# Patient Record
Sex: FEMALE | Race: WHITE | NOT HISPANIC OR LATINO | Employment: PART TIME | URBAN - NONMETROPOLITAN AREA
[De-identification: names, ages, dates, MRNs, and addresses within clinical notes are randomized per-mention and may not be internally consistent; named-entity substitution may affect disease eponyms.]

---

## 2018-06-11 ENCOUNTER — APPOINTMENT (OUTPATIENT)
Dept: LAB | Facility: HOSPITAL | Age: 28
End: 2018-06-11
Attending: OBSTETRICS & GYNECOLOGY

## 2018-06-11 ENCOUNTER — TRANSCRIBE ORDERS (OUTPATIENT)
Dept: ADMINISTRATIVE | Facility: HOSPITAL | Age: 28
End: 2018-06-11

## 2018-06-11 DIAGNOSIS — Z32.02 PREGNANCY TEST NEGATIVE: ICD-10-CM

## 2018-06-11 DIAGNOSIS — Z01.419 PAP SMEAR, AS PART OF ROUTINE GYNECOLOGICAL EXAMINATION: ICD-10-CM

## 2018-06-11 DIAGNOSIS — R10.2 VAGINAL PAIN: Primary | ICD-10-CM

## 2018-06-11 LAB
ALBUMIN SERPL-MCNC: 4.8 G/DL (ref 3.5–5)
ALBUMIN/GLOB SERPL: 1.5 G/DL (ref 1.5–2.5)
ALP SERPL-CCNC: 83 U/L (ref 35–104)
ALT SERPL W P-5'-P-CCNC: 18 U/L (ref 10–36)
ANION GAP SERPL CALCULATED.3IONS-SCNC: 1.5 MMOL/L (ref 3.6–11.2)
AST SERPL-CCNC: 22 U/L (ref 10–30)
BACTERIA UR QL AUTO: ABNORMAL /HPF
BASOPHILS # BLD AUTO: 0.03 10*3/MM3 (ref 0–0.3)
BASOPHILS NFR BLD AUTO: 0.3 % (ref 0–2)
BILIRUB SERPL-MCNC: 0.4 MG/DL (ref 0.2–1.8)
BILIRUB UR QL STRIP: NEGATIVE
BUN BLD-MCNC: 10 MG/DL (ref 7–21)
BUN/CREAT SERPL: 15.4 (ref 7–25)
CALCIUM SPEC-SCNC: 9.3 MG/DL (ref 7.7–10)
CHLORIDE SERPL-SCNC: 106 MMOL/L (ref 99–112)
CLARITY UR: CLEAR
CO2 SERPL-SCNC: 27.5 MMOL/L (ref 24.3–31.9)
COLOR UR: YELLOW
CREAT BLD-MCNC: 0.65 MG/DL (ref 0.43–1.29)
DEPRECATED RDW RBC AUTO: 41.8 FL (ref 37–54)
EOSINOPHIL # BLD AUTO: 0.22 10*3/MM3 (ref 0–0.7)
EOSINOPHIL NFR BLD AUTO: 2 % (ref 0–5)
ERYTHROCYTE [DISTWIDTH] IN BLOOD BY AUTOMATED COUNT: 13.3 % (ref 11.5–14.5)
GFR SERPL CREATININE-BSD FRML MDRD: 110 ML/MIN/1.73
GLOBULIN UR ELPH-MCNC: 3.2 GM/DL
GLUCOSE BLD-MCNC: 88 MG/DL (ref 70–110)
GLUCOSE UR STRIP-MCNC: NEGATIVE MG/DL
HCG INTACT+B SERPL-ACNC: <2 MIU/ML (ref 0–5)
HCT VFR BLD AUTO: 38.2 % (ref 37–47)
HGB BLD-MCNC: 12.4 G/DL (ref 12–16)
HGB UR QL STRIP.AUTO: ABNORMAL
HYALINE CASTS UR QL AUTO: ABNORMAL /LPF
IMM GRANULOCYTES # BLD: 0.03 10*3/MM3 (ref 0–0.03)
IMM GRANULOCYTES NFR BLD: 0.3 % (ref 0–0.5)
KETONES UR QL STRIP: NEGATIVE
LEUKOCYTE ESTERASE UR QL STRIP.AUTO: NEGATIVE
LYMPHOCYTES # BLD AUTO: 2.86 10*3/MM3 (ref 1–3)
LYMPHOCYTES NFR BLD AUTO: 25.4 % (ref 21–51)
MCH RBC QN AUTO: 28.6 PG (ref 27–33)
MCHC RBC AUTO-ENTMCNC: 32.5 G/DL (ref 33–37)
MCV RBC AUTO: 88.2 FL (ref 80–94)
MONOCYTES # BLD AUTO: 0.61 10*3/MM3 (ref 0.1–0.9)
MONOCYTES NFR BLD AUTO: 5.4 % (ref 0–10)
NEUTROPHILS # BLD AUTO: 7.53 10*3/MM3 (ref 1.4–6.5)
NEUTROPHILS NFR BLD AUTO: 66.6 % (ref 30–70)
NITRITE UR QL STRIP: NEGATIVE
OSMOLALITY SERPL CALC.SUM OF ELEC: 268.6 MOSM/KG (ref 273–305)
PH UR STRIP.AUTO: 6.5 [PH] (ref 5–8)
PLATELET # BLD AUTO: 340 10*3/MM3 (ref 130–400)
PMV BLD AUTO: 11.1 FL (ref 6–10)
POTASSIUM BLD-SCNC: 3.8 MMOL/L (ref 3.5–5.3)
PROT SERPL-MCNC: 8 G/DL (ref 6–8)
PROT UR QL STRIP: NEGATIVE
RBC # BLD AUTO: 4.33 10*6/MM3 (ref 4.2–5.4)
RBC # UR: ABNORMAL /HPF
REF LAB TEST METHOD: ABNORMAL
SODIUM BLD-SCNC: 135 MMOL/L (ref 135–153)
SP GR UR STRIP: 1.01 (ref 1–1.03)
SQUAMOUS #/AREA URNS HPF: ABNORMAL /HPF
T4 FREE SERPL-MCNC: 1.03 NG/DL (ref 0.89–1.76)
TSH SERPL DL<=0.05 MIU/L-ACNC: 1.98 MIU/ML (ref 0.55–4.78)
UROBILINOGEN UR QL STRIP: ABNORMAL
WBC NRBC COR # BLD: 11.28 10*3/MM3 (ref 4.5–12.5)
WBC UR QL AUTO: ABNORMAL /HPF

## 2018-06-11 PROCEDURE — 36415 COLL VENOUS BLD VENIPUNCTURE: CPT | Performed by: OBSTETRICS & GYNECOLOGY

## 2018-06-11 PROCEDURE — 81001 URINALYSIS AUTO W/SCOPE: CPT | Performed by: OBSTETRICS & GYNECOLOGY

## 2018-06-11 PROCEDURE — 84702 CHORIONIC GONADOTROPIN TEST: CPT | Performed by: OBSTETRICS & GYNECOLOGY

## 2018-06-11 PROCEDURE — 84443 ASSAY THYROID STIM HORMONE: CPT | Performed by: OBSTETRICS & GYNECOLOGY

## 2018-06-11 PROCEDURE — 85025 COMPLETE CBC W/AUTO DIFF WBC: CPT | Performed by: OBSTETRICS & GYNECOLOGY

## 2018-06-11 PROCEDURE — 80053 COMPREHEN METABOLIC PANEL: CPT | Performed by: OBSTETRICS & GYNECOLOGY

## 2018-06-11 PROCEDURE — 84439 ASSAY OF FREE THYROXINE: CPT | Performed by: OBSTETRICS & GYNECOLOGY

## 2018-07-12 ENCOUNTER — PREP FOR SURGERY (OUTPATIENT)
Dept: OTHER | Facility: HOSPITAL | Age: 28
End: 2018-07-12

## 2018-07-12 DIAGNOSIS — D27.1: Primary | ICD-10-CM

## 2018-07-12 RX ORDER — SODIUM CHLORIDE 0.9 % (FLUSH) 0.9 %
1-10 SYRINGE (ML) INJECTION AS NEEDED
Status: CANCELLED | OUTPATIENT
Start: 2018-07-24

## 2018-07-23 ENCOUNTER — APPOINTMENT (OUTPATIENT)
Dept: PREADMISSION TESTING | Facility: HOSPITAL | Age: 28
End: 2018-07-23

## 2018-07-23 DIAGNOSIS — D27.1: ICD-10-CM

## 2018-07-23 LAB
ABO GROUP BLD: NORMAL
ANION GAP SERPL CALCULATED.3IONS-SCNC: 5.8 MMOL/L (ref 3.6–11.2)
BASOPHILS # BLD AUTO: 0.03 10*3/MM3 (ref 0–0.3)
BASOPHILS NFR BLD AUTO: 0.3 % (ref 0–2)
BLD GP AB SCN SERPL QL: NEGATIVE
BUN BLD-MCNC: 9 MG/DL (ref 7–21)
BUN/CREAT SERPL: 14.3 (ref 7–25)
CALCIUM SPEC-SCNC: 9.6 MG/DL (ref 7.7–10)
CHLORIDE SERPL-SCNC: 104 MMOL/L (ref 99–112)
CO2 SERPL-SCNC: 27.2 MMOL/L (ref 24.3–31.9)
CREAT BLD-MCNC: 0.63 MG/DL (ref 0.43–1.29)
DEPRECATED RDW RBC AUTO: 41.9 FL (ref 37–54)
EOSINOPHIL # BLD AUTO: 0.43 10*3/MM3 (ref 0–0.7)
EOSINOPHIL NFR BLD AUTO: 4.1 % (ref 0–5)
ERYTHROCYTE [DISTWIDTH] IN BLOOD BY AUTOMATED COUNT: 13.3 % (ref 11.5–14.5)
GFR SERPL CREATININE-BSD FRML MDRD: 114 ML/MIN/1.73
GLUCOSE BLD-MCNC: 95 MG/DL (ref 70–110)
HCT VFR BLD AUTO: 39.6 % (ref 37–47)
HGB BLD-MCNC: 12.9 G/DL (ref 12–16)
IMM GRANULOCYTES # BLD: 0.06 10*3/MM3 (ref 0–0.03)
IMM GRANULOCYTES NFR BLD: 0.6 % (ref 0–0.5)
LYMPHOCYTES # BLD AUTO: 2.83 10*3/MM3 (ref 1–3)
LYMPHOCYTES NFR BLD AUTO: 27.3 % (ref 21–51)
MCH RBC QN AUTO: 28.9 PG (ref 27–33)
MCHC RBC AUTO-ENTMCNC: 32.6 G/DL (ref 33–37)
MCV RBC AUTO: 88.6 FL (ref 80–94)
MONOCYTES # BLD AUTO: 0.66 10*3/MM3 (ref 0.1–0.9)
MONOCYTES NFR BLD AUTO: 6.4 % (ref 0–10)
NEUTROPHILS # BLD AUTO: 6.37 10*3/MM3 (ref 1.4–6.5)
NEUTROPHILS NFR BLD AUTO: 61.3 % (ref 30–70)
OSMOLALITY SERPL CALC.SUM OF ELEC: 272.3 MOSM/KG (ref 273–305)
PLATELET # BLD AUTO: 326 10*3/MM3 (ref 130–400)
PMV BLD AUTO: 11.6 FL (ref 6–10)
POTASSIUM BLD-SCNC: 4 MMOL/L (ref 3.5–5.3)
RBC # BLD AUTO: 4.47 10*6/MM3 (ref 4.2–5.4)
RH BLD: POSITIVE
SODIUM BLD-SCNC: 137 MMOL/L (ref 135–153)
T&S EXPIRATION DATE: NORMAL
WBC NRBC COR # BLD: 10.38 10*3/MM3 (ref 4.5–12.5)

## 2018-07-23 PROCEDURE — 80048 BASIC METABOLIC PNL TOTAL CA: CPT | Performed by: OBSTETRICS & GYNECOLOGY

## 2018-07-23 PROCEDURE — 86850 RBC ANTIBODY SCREEN: CPT | Performed by: OBSTETRICS & GYNECOLOGY

## 2018-07-23 PROCEDURE — 86901 BLOOD TYPING SEROLOGIC RH(D): CPT | Performed by: OBSTETRICS & GYNECOLOGY

## 2018-07-23 PROCEDURE — 36415 COLL VENOUS BLD VENIPUNCTURE: CPT

## 2018-07-23 PROCEDURE — 86900 BLOOD TYPING SEROLOGIC ABO: CPT | Performed by: OBSTETRICS & GYNECOLOGY

## 2018-07-23 PROCEDURE — 85025 COMPLETE CBC W/AUTO DIFF WBC: CPT | Performed by: OBSTETRICS & GYNECOLOGY

## 2018-07-23 NOTE — DISCHARGE INSTRUCTIONS
You are scheduled to arrive for your surgery on July 24, 2018 at 9:00 AM    TAKE the following medications the morning of surgery:  All heart or blood pressure medications    Please discontinue all blood thinners and anticoagulants (except aspirin) prior to surgery as per your surgeon and cardiologist instructions.  Aspirin may be continued up to the day prior to surgery.    HOLD all diabetic medications the morning of surgery as order by physician.    Please follow instructions on use of prep cloths provided by nurse. Return instruction sheet with stickers attached to pre-op nurse on day of surgery.    General Instructions:  • Do NOT eat or drink after midnight which includes water, mints, or gum.  • You may brush your teeth. Dental appliances that are removable must be taken out day of surgery.  • Do NOT smoke, chew tobacco, or drink alcohol within 24 hours prior to surgery.  • Bring medications in original bottles, any inhalers and if applicable your C-PAP/BI-PAP machine  • Bring any papers given to you in the doctor’s office  • Wear clean, comfortable clothes and socks  • Do NOT wear contact lenses or make-up or dark nail polish.  Bring a case for your glasses if applicable.  • Bring crutches or walker if applicable  • Leave all other valuables and jewelry at home  • If you were given a blood bank armband, continue to wear it until discharged.    Preventing a Surgical Site Infection:  • Shower the night before surgery (unless instructed otherwise) using a fresh bar of anti-bacterial soap (such as Dial) and clean washcloth.  Dry with a clean towel and dress in clean clothing.  • For 2 to 3 days before surgery, avoid shaving with a razor near where you will have surgery because the razor can irritate skin and make it easier to develop an infection.  Ask your surgeon if you will be receiving antibiotics prior to surgery.  • Make sure you, your family, and all healthcare providers clean their hands with soap and  water or an alcohol-based hand  before caring for you or your wound.  • If at all possible, quit smoking as many days before surgery as you can.    Day of Surgery:  Upon arrival, a pre-op nurse and anesthesiologist will review your health history, obtain vital signs, and answer questions you may have.  The only belongings needed at this time will be your home medications and if applicable you C-PAP/BI-PAP machine.  If you are staying overnight, your family can leave the rest of your belongings in the car and bring them to your room later.  A pre-op nurse will start an IV and you may receive medication in preparation for surgery.  Due to patient privacy and limited space, only one member of your family will be able to accompany you in the pre-op area.  While you are in surgery your family should notify the waiting room  if they leave the waiting room area and provide a contact number.  Please be aware that surgery does come with discomfort.  We want to make every effort to control your discomfort so please discuss any uncontrolled symptoms with your nurse.  Your doctor will most likely have prescribed pain medications.  If you are going home after surgery you will receive individualized written care instructions before being discharged.  A responsible adult must drive you to and from the hospital on the day of surgery and stay with you for 24 hours.  If you are staying overnight following surgery, you will be transported to your hospital room following the recovery period.

## 2018-07-24 ENCOUNTER — HOSPITAL ENCOUNTER (OUTPATIENT)
Facility: HOSPITAL | Age: 28
Setting detail: HOSPITAL OUTPATIENT SURGERY
Discharge: HOME | End: 2018-07-24
Attending: OBSTETRICS & GYNECOLOGY | Admitting: OBSTETRICS & GYNECOLOGY

## 2018-07-24 ENCOUNTER — APPOINTMENT (OUTPATIENT)
Dept: GENERAL RADIOLOGY | Facility: HOSPITAL | Age: 28
End: 2018-07-24
Attending: OBSTETRICS & GYNECOLOGY

## 2018-07-24 ENCOUNTER — ANESTHESIA EVENT (OUTPATIENT)
Dept: PERIOP | Facility: HOSPITAL | Age: 28
End: 2018-07-24

## 2018-07-24 ENCOUNTER — ANESTHESIA (OUTPATIENT)
Dept: PERIOP | Facility: HOSPITAL | Age: 28
End: 2018-07-24

## 2018-07-24 VITALS
SYSTOLIC BLOOD PRESSURE: 123 MMHG | BODY MASS INDEX: 28 KG/M2 | HEIGHT: 64 IN | OXYGEN SATURATION: 97 % | TEMPERATURE: 97.3 F | HEART RATE: 77 BPM | DIASTOLIC BLOOD PRESSURE: 75 MMHG | WEIGHT: 164 LBS | RESPIRATION RATE: 16 BRPM

## 2018-07-24 DIAGNOSIS — D27.1: ICD-10-CM

## 2018-07-24 DIAGNOSIS — N83.8 OVARIAN MASS, LEFT: ICD-10-CM

## 2018-07-24 LAB
B-HCG UR QL: NEGATIVE
INTERNAL NEGATIVE CONTROL: NEGATIVE
INTERNAL POSITIVE CONTROL: POSITIVE
Lab: NORMAL

## 2018-07-24 PROCEDURE — 25010000002 ONDANSETRON PER 1 MG: Performed by: NURSE ANESTHETIST, CERTIFIED REGISTERED

## 2018-07-24 PROCEDURE — 25010000002 CEFOXITIN: Performed by: OBSTETRICS & GYNECOLOGY

## 2018-07-24 PROCEDURE — 25010000002 NEOSTIGMINE 10 MG/10ML SOLUTION: Performed by: NURSE ANESTHETIST, CERTIFIED REGISTERED

## 2018-07-24 PROCEDURE — 25010000002 PROPOFOL 10 MG/ML EMULSION: Performed by: NURSE ANESTHETIST, CERTIFIED REGISTERED

## 2018-07-24 PROCEDURE — 81025 URINE PREGNANCY TEST: CPT | Performed by: ANESTHESIOLOGY

## 2018-07-24 PROCEDURE — 25010000002 FENTANYL CITRATE (PF) 100 MCG/2ML SOLUTION: Performed by: NURSE ANESTHETIST, CERTIFIED REGISTERED

## 2018-07-24 PROCEDURE — 25010000002 DEXAMETHASONE PER 1 MG: Performed by: NURSE ANESTHETIST, CERTIFIED REGISTERED

## 2018-07-24 PROCEDURE — 25010000002 MIDAZOLAM PER 1 MG: Performed by: NURSE ANESTHETIST, CERTIFIED REGISTERED

## 2018-07-24 RX ORDER — OXYCODONE HYDROCHLORIDE AND ACETAMINOPHEN 5; 325 MG/1; MG/1
1 TABLET ORAL EVERY 4 HOURS PRN
Qty: 40 TABLET | Refills: 0 | Status: ON HOLD | OUTPATIENT
Start: 2018-07-24 | End: 2019-03-05

## 2018-07-24 RX ORDER — MEPERIDINE HYDROCHLORIDE 50 MG/ML
12.5 INJECTION INTRAMUSCULAR; INTRAVENOUS; SUBCUTANEOUS
Status: DISCONTINUED | OUTPATIENT
Start: 2018-07-24 | End: 2018-07-24 | Stop reason: HOSPADM

## 2018-07-24 RX ORDER — IPRATROPIUM BROMIDE AND ALBUTEROL SULFATE 2.5; .5 MG/3ML; MG/3ML
3 SOLUTION RESPIRATORY (INHALATION) ONCE AS NEEDED
Status: DISCONTINUED | OUTPATIENT
Start: 2018-07-24 | End: 2018-07-24 | Stop reason: HOSPADM

## 2018-07-24 RX ORDER — MIDAZOLAM HYDROCHLORIDE 1 MG/ML
1 INJECTION INTRAMUSCULAR; INTRAVENOUS
Status: DISCONTINUED | OUTPATIENT
Start: 2018-07-24 | End: 2018-07-24 | Stop reason: HOSPADM

## 2018-07-24 RX ORDER — SODIUM CHLORIDE, SODIUM LACTATE, POTASSIUM CHLORIDE, CALCIUM CHLORIDE 600; 310; 30; 20 MG/100ML; MG/100ML; MG/100ML; MG/100ML
125 INJECTION, SOLUTION INTRAVENOUS CONTINUOUS
Status: DISCONTINUED | OUTPATIENT
Start: 2018-07-24 | End: 2018-07-24 | Stop reason: HOSPADM

## 2018-07-24 RX ORDER — FENTANYL CITRATE 50 UG/ML
INJECTION, SOLUTION INTRAMUSCULAR; INTRAVENOUS AS NEEDED
Status: DISCONTINUED | OUTPATIENT
Start: 2018-07-24 | End: 2018-07-24 | Stop reason: SURG

## 2018-07-24 RX ORDER — SODIUM CHLORIDE 9 MG/ML
INJECTION, SOLUTION INTRAVENOUS AS NEEDED
Status: DISCONTINUED | OUTPATIENT
Start: 2018-07-24 | End: 2018-07-24 | Stop reason: HOSPADM

## 2018-07-24 RX ORDER — LIDOCAINE HYDROCHLORIDE 20 MG/ML
INJECTION, SOLUTION INFILTRATION; PERINEURAL AS NEEDED
Status: DISCONTINUED | OUTPATIENT
Start: 2018-07-24 | End: 2018-07-24 | Stop reason: SURG

## 2018-07-24 RX ORDER — MAGNESIUM HYDROXIDE 1200 MG/15ML
LIQUID ORAL AS NEEDED
Status: DISCONTINUED | OUTPATIENT
Start: 2018-07-24 | End: 2018-07-24 | Stop reason: HOSPADM

## 2018-07-24 RX ORDER — FAMOTIDINE 10 MG/ML
INJECTION, SOLUTION INTRAVENOUS AS NEEDED
Status: DISCONTINUED | OUTPATIENT
Start: 2018-07-24 | End: 2018-07-24 | Stop reason: SURG

## 2018-07-24 RX ORDER — BUPIVACAINE HYDROCHLORIDE AND EPINEPHRINE 5; 5 MG/ML; UG/ML
INJECTION, SOLUTION EPIDURAL; INTRACAUDAL; PERINEURAL AS NEEDED
Status: DISCONTINUED | OUTPATIENT
Start: 2018-07-24 | End: 2018-07-24 | Stop reason: HOSPADM

## 2018-07-24 RX ORDER — FENTANYL CITRATE 50 UG/ML
50 INJECTION, SOLUTION INTRAMUSCULAR; INTRAVENOUS
Status: DISCONTINUED | OUTPATIENT
Start: 2018-07-24 | End: 2018-07-24 | Stop reason: HOSPADM

## 2018-07-24 RX ORDER — OXYCODONE HYDROCHLORIDE AND ACETAMINOPHEN 5; 325 MG/1; MG/1
1 TABLET ORAL ONCE AS NEEDED
Status: DISCONTINUED | OUTPATIENT
Start: 2018-07-24 | End: 2018-07-24 | Stop reason: HOSPADM

## 2018-07-24 RX ORDER — ROCURONIUM BROMIDE 10 MG/ML
INJECTION, SOLUTION INTRAVENOUS AS NEEDED
Status: DISCONTINUED | OUTPATIENT
Start: 2018-07-24 | End: 2018-07-24 | Stop reason: SURG

## 2018-07-24 RX ORDER — MIDAZOLAM HYDROCHLORIDE 1 MG/ML
2 INJECTION INTRAMUSCULAR; INTRAVENOUS
Status: DISCONTINUED | OUTPATIENT
Start: 2018-07-24 | End: 2018-07-24 | Stop reason: HOSPADM

## 2018-07-24 RX ORDER — GLYCOPYRROLATE 0.2 MG/ML
INJECTION INTRAMUSCULAR; INTRAVENOUS AS NEEDED
Status: DISCONTINUED | OUTPATIENT
Start: 2018-07-24 | End: 2018-07-24 | Stop reason: SURG

## 2018-07-24 RX ORDER — SODIUM CHLORIDE 0.9 % (FLUSH) 0.9 %
1-10 SYRINGE (ML) INJECTION AS NEEDED
Status: DISCONTINUED | OUTPATIENT
Start: 2018-07-24 | End: 2018-07-24 | Stop reason: HOSPADM

## 2018-07-24 RX ORDER — ONDANSETRON 2 MG/ML
INJECTION INTRAMUSCULAR; INTRAVENOUS AS NEEDED
Status: DISCONTINUED | OUTPATIENT
Start: 2018-07-24 | End: 2018-07-24 | Stop reason: SURG

## 2018-07-24 RX ORDER — MIDAZOLAM HYDROCHLORIDE 1 MG/ML
INJECTION INTRAMUSCULAR; INTRAVENOUS AS NEEDED
Status: DISCONTINUED | OUTPATIENT
Start: 2018-07-24 | End: 2018-07-24 | Stop reason: SURG

## 2018-07-24 RX ORDER — DEXAMETHASONE SODIUM PHOSPHATE 4 MG/ML
INJECTION, SOLUTION INTRA-ARTICULAR; INTRALESIONAL; INTRAMUSCULAR; INTRAVENOUS; SOFT TISSUE AS NEEDED
Status: DISCONTINUED | OUTPATIENT
Start: 2018-07-24 | End: 2018-07-24 | Stop reason: SURG

## 2018-07-24 RX ORDER — NEOSTIGMINE METHYLSULFATE 1 MG/ML
INJECTION, SOLUTION INTRAVENOUS AS NEEDED
Status: DISCONTINUED | OUTPATIENT
Start: 2018-07-24 | End: 2018-07-24 | Stop reason: SURG

## 2018-07-24 RX ORDER — PROPOFOL 10 MG/ML
VIAL (ML) INTRAVENOUS AS NEEDED
Status: DISCONTINUED | OUTPATIENT
Start: 2018-07-24 | End: 2018-07-24 | Stop reason: SURG

## 2018-07-24 RX ORDER — ONDANSETRON 2 MG/ML
4 INJECTION INTRAMUSCULAR; INTRAVENOUS ONCE AS NEEDED
Status: DISCONTINUED | OUTPATIENT
Start: 2018-07-24 | End: 2018-07-24 | Stop reason: HOSPADM

## 2018-07-24 RX ADMIN — ONDANSETRON 4 MG: 2 INJECTION, SOLUTION INTRAMUSCULAR; INTRAVENOUS at 11:40

## 2018-07-24 RX ADMIN — ONDANSETRON 4 MG: 2 INJECTION, SOLUTION INTRAMUSCULAR; INTRAVENOUS at 13:46

## 2018-07-24 RX ADMIN — FENTANYL CITRATE 100 MCG: 50 INJECTION, SOLUTION INTRAMUSCULAR; INTRAVENOUS at 11:40

## 2018-07-24 RX ADMIN — DEXAMETHASONE SODIUM PHOSPHATE 8 MG: 4 INJECTION, SOLUTION INTRAMUSCULAR; INTRAVENOUS at 11:40

## 2018-07-24 RX ADMIN — OXYCODONE HYDROCHLORIDE AND ACETAMINOPHEN 1 TABLET: 5; 325 TABLET ORAL at 13:46

## 2018-07-24 RX ADMIN — ROCURONIUM BROMIDE 30 MG: 10 INJECTION INTRAVENOUS at 11:40

## 2018-07-24 RX ADMIN — SODIUM CHLORIDE, POTASSIUM CHLORIDE, SODIUM LACTATE AND CALCIUM CHLORIDE: 600; 310; 30; 20 INJECTION, SOLUTION INTRAVENOUS at 11:33

## 2018-07-24 RX ADMIN — MIDAZOLAM HYDROCHLORIDE 2 MG: 1 INJECTION, SOLUTION INTRAMUSCULAR; INTRAVENOUS at 11:34

## 2018-07-24 RX ADMIN — PROPOFOL 150 MG: 10 INJECTION, EMULSION INTRAVENOUS at 11:40

## 2018-07-24 RX ADMIN — FENTANYL CITRATE 50 MCG: 50 INJECTION, SOLUTION INTRAMUSCULAR; INTRAVENOUS at 12:18

## 2018-07-24 RX ADMIN — FENTANYL CITRATE 50 MCG: 50 INJECTION INTRAMUSCULAR; INTRAVENOUS at 12:35

## 2018-07-24 RX ADMIN — FAMOTIDINE 20 MG: 10 INJECTION, SOLUTION INTRAVENOUS at 11:34

## 2018-07-24 RX ADMIN — FENTANYL CITRATE 100 MCG: 50 INJECTION, SOLUTION INTRAMUSCULAR; INTRAVENOUS at 11:34

## 2018-07-24 RX ADMIN — SODIUM CHLORIDE, POTASSIUM CHLORIDE, SODIUM LACTATE AND CALCIUM CHLORIDE: 600; 310; 30; 20 INJECTION, SOLUTION INTRAVENOUS at 12:15

## 2018-07-24 RX ADMIN — SODIUM CHLORIDE, POTASSIUM CHLORIDE, SODIUM LACTATE AND CALCIUM CHLORIDE 125 ML/HR: 600; 310; 30; 20 INJECTION, SOLUTION INTRAVENOUS at 15:00

## 2018-07-24 RX ADMIN — GLYCOPYRROLATE 0.6 MG: 0.2 INJECTION, SOLUTION INTRAMUSCULAR; INTRAVENOUS at 12:16

## 2018-07-24 RX ADMIN — LIDOCAINE HYDROCHLORIDE 60 MG: 20 INJECTION, SOLUTION INFILTRATION; PERINEURAL at 11:34

## 2018-07-24 RX ADMIN — CEFOXITIN 2 G: 2 INJECTION, POWDER, FOR SOLUTION INTRAVENOUS at 11:33

## 2018-07-24 RX ADMIN — SODIUM CHLORIDE, POTASSIUM CHLORIDE, SODIUM LACTATE AND CALCIUM CHLORIDE 125 ML/HR: 600; 310; 30; 20 INJECTION, SOLUTION INTRAVENOUS at 13:52

## 2018-07-24 RX ADMIN — NEOSTIGMINE METHYLSULFATE 4 MG: 1 INJECTION, SOLUTION INTRAVENOUS at 12:16

## 2018-07-24 NOTE — OP NOTE
OPERATIVE NOTE    Preoperative diagnosis: Left ovarian tumor    Postoperative diagnosis: Left ovarian endometrioma    Procedure performed: Laparoscopy with left salpingo-oophorectomy    Specimens removed: Left tube and ovary    Anesthesia: General    Surgeon: Dr. Jordan Mina    Assistant: None    Estimated blood loss: 25 cc    Blood products: None    Grafts/implants: None    Complications: None    Description of the procedure:This patient was taken to the operating room, placed on the operating table in the supine position, and prepped and draped in usual fashion for laparoscopic surgery.  A Payne catheter was placed in her bladder.  We attempted to place a Hulka tenaculum but we could not get through the endocervix.  Following this, a 5 mm Optiview trocar was used to enter through the umbilicus.  Insufflation was accomplished, and a 12 mm trocar was placed in the right lower quadrant and a 5 mm in the left lower quadrant.    We found the following; this patient had a large endometrioma.  The left ovary was stuck to the rectosigmoid and stuck to the posterior aspect of the broad ligament, the posterior cul-de-sac and the left side of the pelvis.  Tube and ovary were involved.  We decided to do left salpingoophrectomy.  We used a combination of blunt and sharp dissection, and we were able to free up the ovary enough to cauterize the infundibulopelvic ligament on the left side.  We use the harmonic scalpel to divide this tissue and eventually get the left tube and ovary out.  The ovary was all full of dark brown liquid material consistent with big endometrioma.  Right tube and ovary looked okay.    All fascial incisions over 8 mm were closed with Vicryl, and the rest of her incisions were closed, after evacuation of CO2, with subcuticular Vicryl and Steri-Strips.  All instruments and the catheter were removed and the patient was returned to the post anesthetic recovery room in satisfactory condition with clear  urine.    Significant points about this procedure: Large left endometrioma.

## 2018-07-24 NOTE — H&P
HISTORY    Chief complaint  This is a 28-year-old patient with a left ovarian tumor.    History of present illness  This patient is a 28 year old  patient, who is admitted for laparoscopic removal of left ovarian tumor that is about 5-6 cm, solid.    Past medical, surgical, obstetrical history  NAD; she has had no surgeries and she is on no medications.    Family history  NAD    Social history  NAD    Functional inquiry  NAD                             PHYSICAL EXAMINATION    General  In no acute distress    HEENT  Throat and ears are clear, no masses or nodes were palpable    CVR  Heart sounds are normal with no murmurs, chest is clear to IPPA    GI/  Abdomen is soft with no masses tenderness or organomegaly.  Pelvic examination reveals a left ovarian tumor    MS  No gross bone or joint abnormalities.                                        IMPRESSION  Left ovarian tumor, 5-6 cm, solid                                    TREATMENT PLAN    Laparoscopy with left ovarian cystectomy or left salpingo-oophorectomy.    I explained that when we do a laparoscopy, we use a small instrument that looks like a pen, with a sharp end on it, and a camera inside of it.  We make a very small incision in the abdomen, and we use this instrument to enter the abdominal cavity under direct vision.  We then fill her up with carbon dioxide.  We will then put in extra instruments if necessary.  She understands that when we do this, there is the very small chance of perforation of vessel or viscus, but that is an unavoidable risk and having any problems occur at this point is very rare.  Of course, if it did, we would need to operate to repair any damage.  She understands and accepts this.

## 2018-07-24 NOTE — ANESTHESIA PREPROCEDURE EVALUATION
Anesthesia Evaluation     Patient summary reviewed and Nursing notes reviewed   no history of anesthetic complications:  NPO Solid Status: > 8 hours  NPO Liquid Status: > 8 hours           Airway   Mallampati: I  TM distance: >3 FB  Neck ROM: full  No difficulty expected  Dental - normal exam     Pulmonary - negative pulmonary ROS and normal exam   Cardiovascular - negative cardio ROS and normal exam        Neuro/Psych- negative ROS  GI/Hepatic/Renal/Endo - negative ROS     Musculoskeletal (-) negative ROS    Abdominal  - normal exam    Bowel sounds: normal.   Substance History - negative use     OB/GYN negative ob/gyn ROS         Other            Phys Exam Other: Permanent retainer                Anesthesia Plan    ASA 2     general     intravenous induction   Anesthetic plan and risks discussed with patient.

## 2018-07-24 NOTE — ANESTHESIA PROCEDURE NOTES
Airway  Urgency: elective    Date/Time: 7/24/2018 11:40 AM  Airway not difficult    General Information and Staff    Patient location during procedure: OR  Anesthesiologist: SHORTY MURRAY  CRNA: MARCI ORELLANA    Indications and Patient Condition  Indications for airway management: airway protection    Preoxygenated: yes  MILS maintained throughout  Mask difficulty assessment: 2 - vent by mask + OA or adjuvant +/- NMBA    Final Airway Details  Final airway type: endotracheal airway      Successful airway: ETT  Cuffed: yes   Successful intubation technique: direct laryngoscopy  Facilitating devices/methods: intubating stylet  Endotracheal tube insertion site: oral  Blade: Portillo  Blade size: #3  ETT size: 7.0 mm  Cormack-Lehane Classification: grade IIa - partial view of glottis  Placement verified by: chest auscultation, capnometry and palpation of cuff   Cuff volume (mL): 6  Measured from: lips  ETT to lips (cm): 22  Number of attempts at approach: 1    Additional Comments  Dentition as preop. No complications noted. Patient tolerated well.  ETT secured.

## 2018-07-24 NOTE — ANESTHESIA POSTPROCEDURE EVALUATION
Patient: Kely Fink    Procedure Summary     Date:  07/24/18 Room / Location:  [facility] OR 04 /  [facility] OR    Anesthesia Start:  1133 Anesthesia Stop:  1230    Procedure:  LAPAROSCOP WITH LEFT OOPHORECTOMY with tube and cyst; extensive lysis of adhesions (Left Abdomen) Diagnosis:  (D49.59)    Surgeon:  Jordan Mina MD Provider:  Hola Boone MD    Anesthesia Type:  general ASA Status:  2          Anesthesia Type: general  Last vitals  BP   118/76 (07/24/18 1310)   Temp   97.2 °F (36.2 °C) (07/24/18 1310)   Pulse   80 (07/24/18 1310)   Resp   16 (07/24/18 1310)     SpO2   96 % (07/24/18 1310)     Post Anesthesia Care and Evaluation    Patient location during evaluation: PHASE II  Patient participation: complete - patient participated  Level of consciousness: awake and alert  Pain score: 1  Pain management: adequate  Airway patency: patent  Anesthetic complications: No anesthetic complications  PONV Status: controlled  Cardiovascular status: acceptable  Respiratory status: acceptable  Hydration status: acceptable

## 2018-07-27 LAB
LAB AP CASE REPORT: NORMAL
PATH REPORT.FINAL DX SPEC: NORMAL

## 2019-03-04 ENCOUNTER — PREP FOR SURGERY (OUTPATIENT)
Dept: OTHER | Facility: HOSPITAL | Age: 29
End: 2019-03-04

## 2019-03-04 ENCOUNTER — TRANSCRIBE ORDERS (OUTPATIENT)
Dept: ADMINISTRATIVE | Facility: HOSPITAL | Age: 29
End: 2019-03-04

## 2019-03-04 ENCOUNTER — LAB (OUTPATIENT)
Dept: LAB | Facility: HOSPITAL | Age: 29
End: 2019-03-04

## 2019-03-04 DIAGNOSIS — O02.1 MISSED ABORTION: ICD-10-CM

## 2019-03-04 DIAGNOSIS — O02.1 MISSED AB: Primary | ICD-10-CM

## 2019-03-04 DIAGNOSIS — O02.1 MISSED ABORTION: Primary | ICD-10-CM

## 2019-03-04 LAB — HCG INTACT+B SERPL-ACNC: ABNORMAL MIU/ML (ref 0–5)

## 2019-03-04 PROCEDURE — 84702 CHORIONIC GONADOTROPIN TEST: CPT

## 2019-03-04 PROCEDURE — 36415 COLL VENOUS BLD VENIPUNCTURE: CPT

## 2019-03-04 RX ORDER — SODIUM CHLORIDE 0.9 % (FLUSH) 0.9 %
3-10 SYRINGE (ML) INJECTION AS NEEDED
Status: CANCELLED | OUTPATIENT
Start: 2019-03-05

## 2019-03-04 RX ORDER — SODIUM CHLORIDE 0.9 % (FLUSH) 0.9 %
3 SYRINGE (ML) INJECTION EVERY 12 HOURS SCHEDULED
Status: CANCELLED | OUTPATIENT
Start: 2019-03-05

## 2019-03-05 ENCOUNTER — ANESTHESIA (OUTPATIENT)
Dept: PERIOP | Facility: HOSPITAL | Age: 29
End: 2019-03-05

## 2019-03-05 ENCOUNTER — HOSPITAL ENCOUNTER (OUTPATIENT)
Facility: HOSPITAL | Age: 29
Setting detail: HOSPITAL OUTPATIENT SURGERY
Discharge: HOME | End: 2019-03-05
Attending: OBSTETRICS & GYNECOLOGY | Admitting: OBSTETRICS & GYNECOLOGY

## 2019-03-05 ENCOUNTER — ANESTHESIA EVENT (OUTPATIENT)
Dept: PERIOP | Facility: HOSPITAL | Age: 29
End: 2019-03-05

## 2019-03-05 ENCOUNTER — APPOINTMENT (OUTPATIENT)
Dept: PREADMISSION TESTING | Facility: HOSPITAL | Age: 29
End: 2019-03-05

## 2019-03-05 VITALS
DIASTOLIC BLOOD PRESSURE: 62 MMHG | BODY MASS INDEX: 28 KG/M2 | TEMPERATURE: 97.9 F | RESPIRATION RATE: 18 BRPM | OXYGEN SATURATION: 99 % | SYSTOLIC BLOOD PRESSURE: 114 MMHG | WEIGHT: 164 LBS | HEART RATE: 64 BPM | HEIGHT: 64 IN

## 2019-03-05 DIAGNOSIS — O02.1 MISSED AB: ICD-10-CM

## 2019-03-05 LAB
ABO GROUP BLD: NORMAL
BASOPHILS # BLD AUTO: 0.02 10*3/MM3 (ref 0–0.3)
BASOPHILS NFR BLD AUTO: 0.2 % (ref 0–2)
BLD GP AB SCN SERPL QL: NEGATIVE
DEPRECATED RDW RBC AUTO: 42.6 FL (ref 37–54)
EOSINOPHIL # BLD AUTO: 0.16 10*3/MM3 (ref 0–0.7)
EOSINOPHIL NFR BLD AUTO: 1.7 % (ref 0–5)
ERYTHROCYTE [DISTWIDTH] IN BLOOD BY AUTOMATED COUNT: 13.3 % (ref 11.5–14.5)
HCT VFR BLD AUTO: 36.9 % (ref 37–47)
HGB BLD-MCNC: 11.9 G/DL (ref 12–16)
IMM GRANULOCYTES # BLD AUTO: 0.02 10*3/MM3 (ref 0–0.03)
IMM GRANULOCYTES NFR BLD AUTO: 0.2 % (ref 0–0.5)
LYMPHOCYTES # BLD AUTO: 2.64 10*3/MM3 (ref 1–3)
LYMPHOCYTES NFR BLD AUTO: 27.6 % (ref 21–51)
MCH RBC QN AUTO: 28.6 PG (ref 27–33)
MCHC RBC AUTO-ENTMCNC: 32.2 G/DL (ref 33–37)
MCV RBC AUTO: 88.7 FL (ref 80–94)
MONOCYTES # BLD AUTO: 0.64 10*3/MM3 (ref 0.1–0.9)
MONOCYTES NFR BLD AUTO: 6.7 % (ref 0–10)
NEUTROPHILS # BLD AUTO: 6.07 10*3/MM3 (ref 1.4–6.5)
NEUTROPHILS NFR BLD AUTO: 63.6 % (ref 30–70)
PLATELET # BLD AUTO: 312 10*3/MM3 (ref 130–400)
PMV BLD AUTO: 11.2 FL (ref 6–10)
RBC # BLD AUTO: 4.16 10*6/MM3 (ref 4.2–5.4)
RH BLD: POSITIVE
T&S EXPIRATION DATE: NORMAL
WBC NRBC COR # BLD: 9.55 10*3/MM3 (ref 4.5–12.5)

## 2019-03-05 PROCEDURE — 25010000002 CEFOXITIN: Performed by: NURSE PRACTITIONER

## 2019-03-05 PROCEDURE — 86901 BLOOD TYPING SEROLOGIC RH(D): CPT | Performed by: NURSE PRACTITIONER

## 2019-03-05 PROCEDURE — 86850 RBC ANTIBODY SCREEN: CPT | Performed by: NURSE PRACTITIONER

## 2019-03-05 PROCEDURE — 86900 BLOOD TYPING SEROLOGIC ABO: CPT | Performed by: NURSE PRACTITIONER

## 2019-03-05 PROCEDURE — 85025 COMPLETE CBC W/AUTO DIFF WBC: CPT | Performed by: NURSE PRACTITIONER

## 2019-03-05 PROCEDURE — 25010000002 FENTANYL CITRATE (PF) 100 MCG/2ML SOLUTION: Performed by: NURSE ANESTHETIST, CERTIFIED REGISTERED

## 2019-03-05 PROCEDURE — 25010000002 PROPOFOL 1000 MG/ML EMULSION: Performed by: NURSE ANESTHETIST, CERTIFIED REGISTERED

## 2019-03-05 PROCEDURE — 25010000002 MIDAZOLAM PER 1 MG: Performed by: NURSE ANESTHETIST, CERTIFIED REGISTERED

## 2019-03-05 PROCEDURE — 25010000002 PROPOFOL 10 MG/ML EMULSION: Performed by: NURSE ANESTHETIST, CERTIFIED REGISTERED

## 2019-03-05 RX ORDER — OXYCODONE HYDROCHLORIDE AND ACETAMINOPHEN 5; 325 MG/1; MG/1
1 TABLET ORAL ONCE AS NEEDED
Status: DISCONTINUED | OUTPATIENT
Start: 2019-03-05 | End: 2019-03-05 | Stop reason: HOSPADM

## 2019-03-05 RX ORDER — ONDANSETRON 2 MG/ML
4 INJECTION INTRAMUSCULAR; INTRAVENOUS ONCE AS NEEDED
Status: DISCONTINUED | OUTPATIENT
Start: 2019-03-05 | End: 2019-03-05 | Stop reason: HOSPADM

## 2019-03-05 RX ORDER — IPRATROPIUM BROMIDE AND ALBUTEROL SULFATE 2.5; .5 MG/3ML; MG/3ML
3 SOLUTION RESPIRATORY (INHALATION) ONCE AS NEEDED
Status: DISCONTINUED | OUTPATIENT
Start: 2019-03-05 | End: 2019-03-05 | Stop reason: HOSPADM

## 2019-03-05 RX ORDER — FENTANYL CITRATE 50 UG/ML
50 INJECTION, SOLUTION INTRAMUSCULAR; INTRAVENOUS
Status: DISCONTINUED | OUTPATIENT
Start: 2019-03-05 | End: 2019-03-05 | Stop reason: HOSPADM

## 2019-03-05 RX ORDER — IBUPROFEN 600 MG/1
600 TABLET ORAL EVERY 6 HOURS PRN
Qty: 30 TABLET | Refills: 0 | Status: SHIPPED | OUTPATIENT
Start: 2019-03-05 | End: 2019-04-04

## 2019-03-05 RX ORDER — SODIUM CHLORIDE 0.9 % (FLUSH) 0.9 %
3 SYRINGE (ML) INJECTION EVERY 12 HOURS SCHEDULED
Status: DISCONTINUED | OUTPATIENT
Start: 2019-03-05 | End: 2019-03-05 | Stop reason: HOSPADM

## 2019-03-05 RX ORDER — MAGNESIUM HYDROXIDE 1200 MG/15ML
LIQUID ORAL AS NEEDED
Status: DISCONTINUED | OUTPATIENT
Start: 2019-03-05 | End: 2019-03-05 | Stop reason: HOSPADM

## 2019-03-05 RX ORDER — OXYTOCIN 10 [USP'U]/ML
INJECTION, SOLUTION INTRAMUSCULAR; INTRAVENOUS AS NEEDED
Status: DISCONTINUED | OUTPATIENT
Start: 2019-03-05 | End: 2019-03-05 | Stop reason: SURG

## 2019-03-05 RX ORDER — PROMETHAZINE HYDROCHLORIDE 12.5 MG/1
12.5 TABLET ORAL EVERY 6 HOURS PRN
COMMUNITY
End: 2020-09-17

## 2019-03-05 RX ORDER — MEPERIDINE HYDROCHLORIDE 25 MG/ML
12.5 INJECTION INTRAMUSCULAR; INTRAVENOUS; SUBCUTANEOUS
Status: DISCONTINUED | OUTPATIENT
Start: 2019-03-05 | End: 2019-03-05 | Stop reason: HOSPADM

## 2019-03-05 RX ORDER — SODIUM CHLORIDE, SODIUM LACTATE, POTASSIUM CHLORIDE, CALCIUM CHLORIDE 600; 310; 30; 20 MG/100ML; MG/100ML; MG/100ML; MG/100ML
125 INJECTION, SOLUTION INTRAVENOUS CONTINUOUS
Status: DISCONTINUED | OUTPATIENT
Start: 2019-03-05 | End: 2019-03-05 | Stop reason: HOSPADM

## 2019-03-05 RX ORDER — FENTANYL CITRATE 50 UG/ML
INJECTION, SOLUTION INTRAMUSCULAR; INTRAVENOUS AS NEEDED
Status: DISCONTINUED | OUTPATIENT
Start: 2019-03-05 | End: 2019-03-05 | Stop reason: SURG

## 2019-03-05 RX ORDER — MIDAZOLAM HYDROCHLORIDE 1 MG/ML
1 INJECTION INTRAMUSCULAR; INTRAVENOUS
Status: DISCONTINUED | OUTPATIENT
Start: 2019-03-05 | End: 2019-03-05 | Stop reason: HOSPADM

## 2019-03-05 RX ORDER — MIDAZOLAM HYDROCHLORIDE 1 MG/ML
INJECTION INTRAMUSCULAR; INTRAVENOUS AS NEEDED
Status: DISCONTINUED | OUTPATIENT
Start: 2019-03-05 | End: 2019-03-05 | Stop reason: SURG

## 2019-03-05 RX ORDER — SODIUM CHLORIDE 0.9 % (FLUSH) 0.9 %
3-10 SYRINGE (ML) INJECTION AS NEEDED
Status: DISCONTINUED | OUTPATIENT
Start: 2019-03-05 | End: 2019-03-05 | Stop reason: HOSPADM

## 2019-03-05 RX ORDER — LIDOCAINE HYDROCHLORIDE 20 MG/ML
INJECTION, SOLUTION INFILTRATION; PERINEURAL AS NEEDED
Status: DISCONTINUED | OUTPATIENT
Start: 2019-03-05 | End: 2019-03-05 | Stop reason: SURG

## 2019-03-05 RX ORDER — MIDAZOLAM HYDROCHLORIDE 1 MG/ML
2 INJECTION INTRAMUSCULAR; INTRAVENOUS
Status: DISCONTINUED | OUTPATIENT
Start: 2019-03-05 | End: 2019-03-05 | Stop reason: HOSPADM

## 2019-03-05 RX ORDER — PROPOFOL 10 MG/ML
VIAL (ML) INTRAVENOUS AS NEEDED
Status: DISCONTINUED | OUTPATIENT
Start: 2019-03-05 | End: 2019-03-05 | Stop reason: SURG

## 2019-03-05 RX ORDER — PRENATAL VIT/IRON FUM/FOLIC AC 27MG-0.8MG
1 TABLET ORAL DAILY
COMMUNITY
End: 2020-09-17

## 2019-03-05 RX ADMIN — MIDAZOLAM HYDROCHLORIDE 2 MG: 1 INJECTION, SOLUTION INTRAMUSCULAR; INTRAVENOUS at 10:52

## 2019-03-05 RX ADMIN — FENTANYL CITRATE 100 MCG: 50 INJECTION INTRAMUSCULAR; INTRAVENOUS at 10:52

## 2019-03-05 RX ADMIN — OXYTOCIN 20 UNITS: 10 INJECTION, SOLUTION INTRAMUSCULAR; INTRAVENOUS at 11:18

## 2019-03-05 RX ADMIN — LIDOCAINE HYDROCHLORIDE 60 MG: 20 INJECTION, SOLUTION INFILTRATION; PERINEURAL at 10:54

## 2019-03-05 RX ADMIN — CEFOXITIN 2 G: 2 INJECTION, POWDER, FOR SOLUTION INTRAVENOUS at 10:52

## 2019-03-05 RX ADMIN — FENTANYL CITRATE 50 MCG: 50 INJECTION INTRAMUSCULAR; INTRAVENOUS at 11:34

## 2019-03-05 RX ADMIN — PROPOFOL 140 MCG/KG/MIN: 10 INJECTION, EMULSION INTRAVENOUS at 10:54

## 2019-03-05 RX ADMIN — SODIUM CHLORIDE, POTASSIUM CHLORIDE, SODIUM LACTATE AND CALCIUM CHLORIDE: 600; 310; 30; 20 INJECTION, SOLUTION INTRAVENOUS at 10:52

## 2019-03-05 RX ADMIN — PROPOFOL 50 MG: 10 INJECTION, EMULSION INTRAVENOUS at 10:54

## 2019-03-05 NOTE — H&P
HISTORY    Chief complaint  Missed     History of present illness  This is a 29-year-old patient who has a missed .  Her hCGs are dropping and her serial ultrasounds show no fetal cardiac activity.    Past medical, surgical, obstetrical history  NAD; in the past, she had a diagnostic laparoscopy.  Her only medications consist of promethazine and prenatal vitamins    Family history  NAD    Social history  NAD    Functional inquiry  NAD                             PHYSICAL EXAMINATION    General  In no acute distress    HEENT  Throat and ears are clear, no masses or nodes were palpable    CVR  Heart sounds are normal with no murmurs, chest is clear to IPPA    GI/  Abdomen is soft with no masses tenderness or organomegaly.  Pelvic examination reveals a bulky uterus, no bleeding    MS  No gross bone or joint abnormalities.                                        IMPRESSION  Missed                                     TREATMENT PLAN    Suction D&C    I explained that when we do a D&C, we grasp her cervix with a tenaculum, dilate her cervix, and carefully do a curettage to remove the products of conception.  She understands that there is the possibility of uterine perforation, and that usually if this happens it is not a serious complication, but could be and in very very rare cases, might eventuate in hysterectomy.  She also understands that occasionally, incomplete evacuation happens and another D&C may be necessary.

## 2019-03-05 NOTE — ANESTHESIA POSTPROCEDURE EVALUATION
Patient: Kely Fink    Procedure Summary     Date:  03/05/19 Room / Location:   [facility] OR 04 /  [facility]    Anesthesia Start:  1052 Anesthesia Stop:  1123    Procedure:  DILATATION AND CURETTAGE WITH SUCTION (N/A Vagina) Diagnosis:  (O02.1)    Surgeon:  Jordan Mina MD Provider:  Hola Boone MD    Anesthesia Type:  general ASA Status:  2          Anesthesia Type: general  Last vitals  BP   103/67 (03/05/19 1128)   Temp   97.1 °F (36.2 °C) (03/05/19 1123)   Pulse   83 (03/05/19 1128)   Resp   16 (03/05/19 1128)     SpO2   99 % (03/05/19 1128)     Post Anesthesia Care and Evaluation    Patient location during evaluation: bedside  Patient participation: complete - patient participated  Level of consciousness: awake and alert  Pain score: 1  Pain management: adequate  Airway patency: patent  Anesthetic complications: No anesthetic complications  PONV Status: none  Cardiovascular status: acceptable  Respiratory status: acceptable  Hydration status: acceptable

## 2019-03-05 NOTE — ANESTHESIA PREPROCEDURE EVALUATION
Anesthesia Evaluation     history of anesthetic complications: PONV  NPO Solid Status: > 8 hours  NPO Liquid Status: > 8 hours           Airway   Mallampati: I  TM distance: >3 FB  Neck ROM: full  No difficulty expected  Dental - normal exam     Pulmonary - normal exam   Cardiovascular - normal exam        Neuro/Psych  GI/Hepatic/Renal/Endo    (+)  GERD,      Musculoskeletal     Abdominal  - normal exam    Bowel sounds: normal.   Substance History      OB/GYN          Other                        Anesthesia Plan    ASA 2     general     intravenous induction   Anesthetic plan, all risks, benefits, and alternatives have been provided, discussed and informed consent has been obtained with: patient.

## 2019-03-05 NOTE — OP NOTE
OPERATIVE NOTE    Preoperative diagnosis: Missed     Postoperative diagnosis: Same as above    Procedure performed: Suction D&C for missed     Specimens removed: Products of conception    Anesthesia: General    Surgeon: Dr. Jordan Mina    Assistant: None    Estimated blood loss: 50 cc    Blood products: None    Grafts/implants: None    Complications: None    Description of the procedure: This patient was taken to the operating room, placed on the operating table in the supine position, and prepped and draped in the usual fashion for suction D&C.  Bimanual examination was carried out, revealing a uterus commensurate with her dates.  A weighted vaginal speculum was inserted, the cervix was grasped with a tenaculum, and the uterus was sounded to 10 centimeters.  The cervix was checked, and found to be slightly open.  The cervix was dilated appropriately.  We then proceeded.    A combination of sharp and suction curettage was used to empty the uterus.  At the conclusion of the operation, we used a sharp curette to confirm a gritty feel all away around the uterus, confirming that we had done a good job and the uterus was empty.    The patient tolerated her procedure well, and she was returned to the post anesthetic recovery room in satisfactory condition.    Significant points about this procedure: This patient seemed to have a large uterus with not as much tissue as I would have expected.  To sharp curettage, circumferentially, she did feel gritty at the conclusion of the procedure.

## 2019-03-07 LAB
LAB AP CASE REPORT: NORMAL
PATH REPORT.FINAL DX SPEC: NORMAL

## 2020-09-17 ENCOUNTER — OFFICE VISIT (OUTPATIENT)
Dept: OBSTETRICS AND GYNECOLOGY | Facility: CLINIC | Age: 30
End: 2020-09-17

## 2020-09-17 VITALS
WEIGHT: 179 LBS | HEIGHT: 63 IN | SYSTOLIC BLOOD PRESSURE: 116 MMHG | BODY MASS INDEX: 31.71 KG/M2 | DIASTOLIC BLOOD PRESSURE: 72 MMHG

## 2020-09-17 DIAGNOSIS — N80.9 ENDOMETRIOSIS: ICD-10-CM

## 2020-09-17 DIAGNOSIS — R10.2 PELVIC PAIN: Primary | ICD-10-CM

## 2020-09-17 PROCEDURE — 99203 OFFICE O/P NEW LOW 30 MIN: CPT | Performed by: OBSTETRICS & GYNECOLOGY

## 2020-09-17 NOTE — PROGRESS NOTES
Subjective   Chief Complaint   Patient presents with   • Pelvic Pain   • Contraception     Kely Fink is a 30 y.o. year old .  Patient's last menstrual period was 2020 (exact date).  She presents to be seen because of  Pelvic pain and contraception. TRying for pregnancy-- regular menses- + moliminal symptoms.  Patient achieved pregnancy a year and a half ago on the fourth round of Clomid.  This unfortunately resulted in a loss at about 9 weeks..     OTHER COMPLAINTS:  Nothing else    The following portions of the patient's history were reviewed and updated as appropriate:  She  has a past medical history of Endometriosis, Female infertility, GERD (gastroesophageal reflux disease), Missed ab, Ovarian cyst, PONV (postoperative nausea and vomiting), and Reflux esophagitis.  She does not have any pertinent problems on file.  She  has a past surgical history that includes [place] tooth extraction; Esophagogastroduodenoscopy; Ovarian Cyst Drainage/Excision (Left, 2018); Oophorectomy (Left); and d&c with suction (N/A, 3/5/2019).  Her family history is not on file.  She  reports that she has never smoked. She has never used smokeless tobacco. She reports that she does not drink alcohol or use drugs.  No current outpatient medications on file.     No current facility-administered medications for this visit.      Current Outpatient Medications on File Prior to Visit   Medication Sig   • [DISCONTINUED] Prenatal Vit-Fe Fumarate-FA (PRENATAL VITAMIN 27-0.8) 27-0.8 MG tablet tablet Take 1 tablet by mouth Daily.   • [DISCONTINUED] promethazine (PHENERGAN) 12.5 MG tablet Take 12.5 mg by mouth Every 6 (Six) Hours As Needed for Nausea or Vomiting.     No current facility-administered medications on file prior to visit.      She has No Known Allergies.    Social History    Tobacco Use      Smoking status: Never Smoker      Smokeless tobacco: Never Used    Review of Systems  Consitutional POS: nothing reported    NEG:  "anorexia or night sweats   Gastointestinal POS: nothing reported    NEG: bloating, change in bowel habits, melena or reflux symptoms   Genitourinary POS: nothing reported    NEG: dysuria or hematuria   Integument POS: nothing reported    NEG: moles that are changing in size, shape, color or rashes   Breast POS: nothing reported    NEG: persistent breast lump, skin dimpling or nipple discharge         Respiratory: negative  Cardiovascular: negative          Objective   /72   Ht 160 cm (63\")   Wt 81.2 kg (179 lb)   LMP 09/17/2020 (Exact Date)   BMI 31.71 kg/m²     General:  well developed; well nourished  no acute distress   Skin:  Not performed.   Thyroid: not examined   Lungs:  breathing is unlabored   Heart:  Not performed.   Breasts:  Not performed.   Abdomen: soft, non-tender; no masses  no umbilical or inguinal hernias are present  no hepato-splenomegaly   Pelvis: Not performed.     Psychiatric: Alert and oriented ×3, mood and affect appropriate  HEENT: Atraumatic, normocephalic, normal scleral icterus  Extremities: 2+ pulses bilaterally, no edema      Lab Review   No data reviewed    Imaging   Pelvic ultrasound images independantly reviewed - uterus anteverted [id number], ET 8mm, small ovulatory type cyst 1.4cm, no solid masses, no FF        Assessment   1. Dysmenorrhea  2. Trying for pregnancy: + ovulation history  3. H/O Endometrioma     Plan   1. I think given history coupled with fertility issues as well as dysmenorrhea we need to go ahead and proceed another diagnostic laparoscopy with possible ablation of endometriosis/also chromopertubation  2. R/B A    No orders of the defined types were placed in this encounter.         This note was electronically signed.      September 17, 2020      "

## 2020-09-22 ENCOUNTER — RESULTS ENCOUNTER (OUTPATIENT)
Dept: OBSTETRICS AND GYNECOLOGY | Facility: CLINIC | Age: 30
End: 2020-09-22

## 2020-09-22 DIAGNOSIS — R10.2 PELVIC PAIN: ICD-10-CM

## 2020-09-22 DIAGNOSIS — N80.9 ENDOMETRIOSIS: ICD-10-CM

## 2020-10-20 ENCOUNTER — APPOINTMENT (OUTPATIENT)
Dept: PREADMISSION TESTING | Facility: HOSPITAL | Age: 30
End: 2020-10-20

## 2020-10-20 VITALS — BODY MASS INDEX: 32.43 KG/M2 | HEIGHT: 63 IN | WEIGHT: 183 LBS

## 2020-10-20 DIAGNOSIS — R10.2 PELVIC PAIN: ICD-10-CM

## 2020-10-20 DIAGNOSIS — N80.9 ENDOMETRIOSIS: ICD-10-CM

## 2020-10-20 LAB
B-HCG UR QL: NEGATIVE
BASOPHILS # BLD AUTO: 0.06 10*3/MM3 (ref 0–0.2)
BASOPHILS NFR BLD AUTO: 0.5 % (ref 0–1.5)
BILIRUB UR QL STRIP: NEGATIVE
CLARITY UR: CLEAR
COLOR UR: YELLOW
DEPRECATED RDW RBC AUTO: 40.1 FL (ref 37–54)
EOSINOPHIL # BLD AUTO: 0.16 10*3/MM3 (ref 0–0.4)
EOSINOPHIL NFR BLD AUTO: 1.4 % (ref 0.3–6.2)
ERYTHROCYTE [DISTWIDTH] IN BLOOD BY AUTOMATED COUNT: 12.7 % (ref 12.3–15.4)
GLUCOSE UR STRIP-MCNC: NEGATIVE MG/DL
HCT VFR BLD AUTO: 37.3 % (ref 34–46.6)
HGB BLD-MCNC: 12.5 G/DL (ref 12–15.9)
HGB UR QL STRIP.AUTO: NEGATIVE
IMM GRANULOCYTES # BLD AUTO: 0.06 10*3/MM3 (ref 0–0.05)
IMM GRANULOCYTES NFR BLD AUTO: 0.5 % (ref 0–0.5)
KETONES UR QL STRIP: NEGATIVE
LEUKOCYTE ESTERASE UR QL STRIP.AUTO: NEGATIVE
LYMPHOCYTES # BLD AUTO: 3.57 10*3/MM3 (ref 0.7–3.1)
LYMPHOCYTES NFR BLD AUTO: 30.5 % (ref 19.6–45.3)
MCH RBC QN AUTO: 29.1 PG (ref 26.6–33)
MCHC RBC AUTO-ENTMCNC: 33.5 G/DL (ref 31.5–35.7)
MCV RBC AUTO: 86.9 FL (ref 79–97)
MONOCYTES # BLD AUTO: 0.57 10*3/MM3 (ref 0.1–0.9)
MONOCYTES NFR BLD AUTO: 4.9 % (ref 5–12)
NEUTROPHILS NFR BLD AUTO: 62.2 % (ref 42.7–76)
NEUTROPHILS NFR BLD AUTO: 7.3 10*3/MM3 (ref 1.7–7)
NITRITE UR QL STRIP: NEGATIVE
NRBC BLD AUTO-RTO: 0 /100 WBC (ref 0–0.2)
PH UR STRIP.AUTO: 5.5 [PH] (ref 5–8)
PLATELET # BLD AUTO: 348 10*3/MM3 (ref 140–450)
PMV BLD AUTO: 11.7 FL (ref 6–12)
PROT UR QL STRIP: NEGATIVE
RBC # BLD AUTO: 4.29 10*6/MM3 (ref 3.77–5.28)
SP GR UR STRIP: 1.01 (ref 1–1.03)
UROBILINOGEN UR QL STRIP: NORMAL
WBC # BLD AUTO: 11.72 10*3/MM3 (ref 3.4–10.8)

## 2020-10-20 PROCEDURE — 81003 URINALYSIS AUTO W/O SCOPE: CPT

## 2020-10-20 PROCEDURE — 36415 COLL VENOUS BLD VENIPUNCTURE: CPT | Performed by: OBSTETRICS & GYNECOLOGY

## 2020-10-20 PROCEDURE — U0004 COV-19 TEST NON-CDC HGH THRU: HCPCS

## 2020-10-20 PROCEDURE — 85025 COMPLETE CBC W/AUTO DIFF WBC: CPT | Performed by: OBSTETRICS & GYNECOLOGY

## 2020-10-20 PROCEDURE — C9803 HOPD COVID-19 SPEC COLLECT: HCPCS | Performed by: OBSTETRICS & GYNECOLOGY

## 2020-10-20 PROCEDURE — 81025 URINE PREGNANCY TEST: CPT

## 2020-10-20 NOTE — DISCHARGE INSTRUCTIONS
PAT PASS GIVEN/REVIEWED WITH PT.  VERBALIZED UNDERSTANDING OF THE FOLLOWING:  DO NOT EAT, DRINK, SMOKE, USE SMOKELESS TOBACCO OR CHEW GUM AFTER MIDNIGHT THE NIGHT BEFORE SURGERY.  THIS ALSO INCLUDES HARD CANDIES AND MINTS.    DO NOT SHAVE THE AREA TO BE OPERATED ON AT LEAST 48 HOURS PRIOR TO THE PROCEDURE.  DO NOT WEAR MAKE UP OR NAIL POLISH.  DO NOT LEAVE IN ANY PIERCING OR WEAR JEWELRY THE DAY OF SURGERY.      DO NOT USE ADHESIVES IF YOU WEAR DENTURES.    DO NOT WEAR EYE CONTACTS; BRING IN YOUR GLASSES.    ONLY TAKE MEDICATION THE MORNING OF YOUR PROCEDURE IF INSTRUCTED BY YOUR SURGEON WITH ENOUGH WATER TO SWALLOW THE MEDICATION.  IF YOUR SURGEON DID NOT SPECIFY WHICH MEDICATIONS TO TAKE, YOU WILL NEED TO CALL THEIR OFFICE FOR FURTHER INSTRUCTIONS AND DO AS THEY INSTRUCT.    LEAVE ANYTHING YOU CONSIDER VALUABLE AT HOME.    YOU WILL NEED TO ARRANGE FOR SOMEONE TO DRIVE YOU HOME AFTER SURGERY.  IT IS RECOMMENDED THAT YOU DO NOT DRIVE, WORK, DRINK ALCOHOL OR MAKE MAJOR DECISIONS FOR AT LEAST 24 HOURS AFTER YOUR PROCEDURE IS COMPLETE.      THE DAY OF YOUR PROCEDURE, BRING IN THE FOLLOWING IF APPLICABLE:   PICTURE ID AND INSURANCE/MEDICARE OR MEDICAID CARDS/ANY CO-PAY THAT MAY BE DUE   COPY OF ADVANCED DIRECTIVE/LIVING WILL/POWER OR    CPAP/BIPAP/INHALERS   SKIN PREP SHEET   YOUR PREADMISSION TESTING PASS (IF NOT A PHONE HISTORY)        PAT patient education COVID testing pre-op instructions reviewed with pt.  Verbalized understanding.      Chlorhexidine wipes along with instruction/verification sheet given to pt.  Instructed pt to apply stickers from chlorhexidine wipes to verification sheet once skin prep has been  completed, and [facility].  Pt. Verbalizes understanding.

## 2020-10-21 LAB — SARS-COV-2 RNA RESP QL NAA+PROBE: NOT DETECTED

## 2020-10-23 ENCOUNTER — ANESTHESIA (OUTPATIENT)
Dept: PERIOP | Facility: HOSPITAL | Age: 30
End: 2020-10-23

## 2020-10-23 ENCOUNTER — HOSPITAL ENCOUNTER (OUTPATIENT)
Facility: HOSPITAL | Age: 30
Discharge: HOME | End: 2020-10-23
Attending: OBSTETRICS & GYNECOLOGY | Admitting: OBSTETRICS & GYNECOLOGY

## 2020-10-23 ENCOUNTER — ANESTHESIA EVENT (OUTPATIENT)
Dept: PERIOP | Facility: HOSPITAL | Age: 30
End: 2020-10-23

## 2020-10-23 VITALS
HEART RATE: 69 BPM | SYSTOLIC BLOOD PRESSURE: 122 MMHG | TEMPERATURE: 97.9 F | RESPIRATION RATE: 18 BRPM | OXYGEN SATURATION: 94 % | DIASTOLIC BLOOD PRESSURE: 61 MMHG

## 2020-10-23 DIAGNOSIS — N80.9 ENDOMETRIOSIS: Primary | ICD-10-CM

## 2020-10-23 PROCEDURE — 25010000002 PROPOFOL 200 MG/20ML EMULSION: Performed by: NURSE ANESTHETIST, CERTIFIED REGISTERED

## 2020-10-23 PROCEDURE — 25010000002 DEXAMETHASONE PER 1 MG: Performed by: NURSE ANESTHETIST, CERTIFIED REGISTERED

## 2020-10-23 PROCEDURE — 25010000002 HYDROMORPHONE PER 4 MG: Performed by: NURSE ANESTHETIST, CERTIFIED REGISTERED

## 2020-10-23 PROCEDURE — 58350 REOPEN FALLOPIAN TUBE: CPT | Performed by: OBSTETRICS & GYNECOLOGY

## 2020-10-23 PROCEDURE — 25010000002 KETOROLAC TROMETHAMINE PER 15 MG: Performed by: NURSE ANESTHETIST, CERTIFIED REGISTERED

## 2020-10-23 PROCEDURE — 25010000002 ONDANSETRON PER 1 MG: Performed by: NURSE ANESTHETIST, CERTIFIED REGISTERED

## 2020-10-23 PROCEDURE — 25010000002 HALOPERIDOL LACTATE PER 5 MG: Performed by: NURSE ANESTHETIST, CERTIFIED REGISTERED

## 2020-10-23 PROCEDURE — 94799 UNLISTED PULMONARY SVC/PX: CPT

## 2020-10-23 PROCEDURE — 58662 LAPAROSCOPY EXCISE LESIONS: CPT | Performed by: OBSTETRICS & GYNECOLOGY

## 2020-10-23 PROCEDURE — 25010000002 HYDROMORPHONE 1 MG/ML SOLUTION

## 2020-10-23 RX ORDER — HYDROMORPHONE HCL 110MG/55ML
PATIENT CONTROLLED ANALGESIA SYRINGE INTRAVENOUS AS NEEDED
Status: DISCONTINUED | OUTPATIENT
Start: 2020-10-23 | End: 2020-10-23 | Stop reason: SURG

## 2020-10-23 RX ORDER — PROPOFOL 10 MG/ML
INJECTION, EMULSION INTRAVENOUS AS NEEDED
Status: DISCONTINUED | OUTPATIENT
Start: 2020-10-23 | End: 2020-10-23 | Stop reason: SURG

## 2020-10-23 RX ORDER — IBUPROFEN 600 MG/1
600 TABLET ORAL EVERY 6 HOURS PRN
Status: DISCONTINUED | OUTPATIENT
Start: 2020-10-23 | End: 2020-10-23 | Stop reason: HOSPADM

## 2020-10-23 RX ORDER — NEOSTIGMINE METHYLSULFATE 5 MG/5 ML
SYRINGE (ML) INTRAVENOUS AS NEEDED
Status: DISCONTINUED | OUTPATIENT
Start: 2020-10-23 | End: 2020-10-23 | Stop reason: SURG

## 2020-10-23 RX ORDER — IBUPROFEN 600 MG/1
600 TABLET ORAL EVERY 6 HOURS PRN
Qty: 30 TABLET | Refills: 1 | Status: SHIPPED | OUTPATIENT
Start: 2020-10-23 | End: 2022-09-14

## 2020-10-23 RX ORDER — HALOPERIDOL 5 MG/ML
INJECTION INTRAMUSCULAR AS NEEDED
Status: DISCONTINUED | OUTPATIENT
Start: 2020-10-23 | End: 2020-10-23 | Stop reason: SURG

## 2020-10-23 RX ORDER — DEXAMETHASONE SODIUM PHOSPHATE 4 MG/ML
INJECTION, SOLUTION INTRA-ARTICULAR; INTRALESIONAL; INTRAMUSCULAR; INTRAVENOUS; SOFT TISSUE AS NEEDED
Status: DISCONTINUED | OUTPATIENT
Start: 2020-10-23 | End: 2020-10-23 | Stop reason: SURG

## 2020-10-23 RX ORDER — SODIUM CHLORIDE, SODIUM LACTATE, POTASSIUM CHLORIDE, CALCIUM CHLORIDE 600; 310; 30; 20 MG/100ML; MG/100ML; MG/100ML; MG/100ML
1000 INJECTION, SOLUTION INTRAVENOUS CONTINUOUS
Status: DISCONTINUED | OUTPATIENT
Start: 2020-10-23 | End: 2020-10-23 | Stop reason: HOSPADM

## 2020-10-23 RX ORDER — MAGNESIUM HYDROXIDE 1200 MG/15ML
LIQUID ORAL AS NEEDED
Status: DISCONTINUED | OUTPATIENT
Start: 2020-10-23 | End: 2020-10-23 | Stop reason: HOSPADM

## 2020-10-23 RX ORDER — HYDROCODONE BITARTRATE AND ACETAMINOPHEN 5; 325 MG/1; MG/1
1 TABLET ORAL EVERY 6 HOURS PRN
Qty: 6 TABLET | Refills: 0 | Status: SHIPPED | OUTPATIENT
Start: 2020-10-23 | End: 2022-09-14

## 2020-10-23 RX ORDER — KETAMINE HCL IN NACL, ISO-OSM 100MG/10ML
SYRINGE (ML) INJECTION AS NEEDED
Status: DISCONTINUED | OUTPATIENT
Start: 2020-10-23 | End: 2020-10-23 | Stop reason: SURG

## 2020-10-23 RX ORDER — ONDANSETRON 2 MG/ML
4 INJECTION INTRAMUSCULAR; INTRAVENOUS ONCE AS NEEDED
Status: DISCONTINUED | OUTPATIENT
Start: 2020-10-23 | End: 2020-10-23 | Stop reason: HOSPADM

## 2020-10-23 RX ORDER — LIDOCAINE HYDROCHLORIDE 20 MG/ML
INJECTION, SOLUTION INTRAVENOUS AS NEEDED
Status: DISCONTINUED | OUTPATIENT
Start: 2020-10-23 | End: 2020-10-23 | Stop reason: SURG

## 2020-10-23 RX ORDER — HYDROCODONE BITARTRATE AND ACETAMINOPHEN 5; 325 MG/1; MG/1
1 TABLET ORAL ONCE AS NEEDED
Status: DISCONTINUED | OUTPATIENT
Start: 2020-10-23 | End: 2020-10-23 | Stop reason: HOSPADM

## 2020-10-23 RX ORDER — SODIUM CHLORIDE 0.9 % (FLUSH) 0.9 %
10 SYRINGE (ML) INJECTION AS NEEDED
Status: DISCONTINUED | OUTPATIENT
Start: 2020-10-23 | End: 2020-10-23 | Stop reason: HOSPADM

## 2020-10-23 RX ORDER — PROMETHAZINE HYDROCHLORIDE 25 MG/1
25 SUPPOSITORY RECTAL ONCE AS NEEDED
Status: DISCONTINUED | OUTPATIENT
Start: 2020-10-23 | End: 2020-10-23 | Stop reason: HOSPADM

## 2020-10-23 RX ORDER — ROCURONIUM BROMIDE 10 MG/ML
INJECTION, SOLUTION INTRAVENOUS AS NEEDED
Status: DISCONTINUED | OUTPATIENT
Start: 2020-10-23 | End: 2020-10-23 | Stop reason: SURG

## 2020-10-23 RX ORDER — SCOLOPAMINE TRANSDERMAL SYSTEM 1 MG/1
1 PATCH, EXTENDED RELEASE TRANSDERMAL
Status: DISCONTINUED | OUTPATIENT
Start: 2020-10-23 | End: 2020-10-23 | Stop reason: HOSPADM

## 2020-10-23 RX ORDER — ONDANSETRON 2 MG/ML
INJECTION INTRAMUSCULAR; INTRAVENOUS AS NEEDED
Status: DISCONTINUED | OUTPATIENT
Start: 2020-10-23 | End: 2020-10-23 | Stop reason: SURG

## 2020-10-23 RX ORDER — KETOROLAC TROMETHAMINE 30 MG/ML
INJECTION, SOLUTION INTRAMUSCULAR; INTRAVENOUS AS NEEDED
Status: DISCONTINUED | OUTPATIENT
Start: 2020-10-23 | End: 2020-10-23 | Stop reason: SURG

## 2020-10-23 RX ORDER — PROMETHAZINE HYDROCHLORIDE 25 MG/1
25 TABLET ORAL ONCE AS NEEDED
Status: DISCONTINUED | OUTPATIENT
Start: 2020-10-23 | End: 2020-10-23 | Stop reason: HOSPADM

## 2020-10-23 RX ORDER — SCOLOPAMINE TRANSDERMAL SYSTEM 1 MG/1
1 PATCH, EXTENDED RELEASE TRANSDERMAL CONTINUOUS
Status: DISCONTINUED | OUTPATIENT
Start: 2020-10-23 | End: 2020-10-23 | Stop reason: HOSPADM

## 2020-10-23 RX ADMIN — LIDOCAINE HYDROCHLORIDE 100 MG: 20 INJECTION, SOLUTION INTRAVENOUS at 08:11

## 2020-10-23 RX ADMIN — SODIUM CHLORIDE, POTASSIUM CHLORIDE, SODIUM LACTATE AND CALCIUM CHLORIDE: 600; 310; 30; 20 INJECTION, SOLUTION INTRAVENOUS at 08:39

## 2020-10-23 RX ADMIN — HYDROMORPHONE HYDROCHLORIDE 0.5 MG: 2 INJECTION, SOLUTION INTRAMUSCULAR; INTRAVENOUS; SUBCUTANEOUS at 08:22

## 2020-10-23 RX ADMIN — ROCURONIUM BROMIDE 35 MG: 10 INJECTION INTRAVENOUS at 08:12

## 2020-10-23 RX ADMIN — Medication 25 MG: at 08:15

## 2020-10-23 RX ADMIN — ONDANSETRON 4 MG: 2 INJECTION INTRAMUSCULAR; INTRAVENOUS at 08:11

## 2020-10-23 RX ADMIN — Medication 2 MG: at 08:40

## 2020-10-23 RX ADMIN — GLYCOPYRROLATE 0.2 MG: 0.2 INJECTION, SOLUTION INTRAMUSCULAR; INTRAVENOUS at 08:40

## 2020-10-23 RX ADMIN — PROPOFOL 200 MG: 10 INJECTION, EMULSION INTRAVENOUS at 08:11

## 2020-10-23 RX ADMIN — HYDROMORPHONE HYDROCHLORIDE 0.5 MG: 1 INJECTION, SOLUTION INTRAMUSCULAR; INTRAVENOUS; SUBCUTANEOUS at 09:13

## 2020-10-23 RX ADMIN — HALOPERIDOL LACTATE 0.5 MG: 5 INJECTION, SOLUTION INTRAMUSCULAR at 08:21

## 2020-10-23 RX ADMIN — DEXAMETHASONE SODIUM PHOSPHATE 8 MG: 4 INJECTION, SOLUTION INTRAMUSCULAR; INTRAVENOUS at 08:11

## 2020-10-23 RX ADMIN — Medication 0.5 MG: at 09:13

## 2020-10-23 RX ADMIN — SCOPALAMINE 1 PATCH: 1 PATCH, EXTENDED RELEASE TRANSDERMAL at 07:58

## 2020-10-23 RX ADMIN — SODIUM CHLORIDE, POTASSIUM CHLORIDE, SODIUM LACTATE AND CALCIUM CHLORIDE 1000 ML: 600; 310; 30; 20 INJECTION, SOLUTION INTRAVENOUS at 07:47

## 2020-10-23 RX ADMIN — PROPOFOL 200 MG: 10 INJECTION, EMULSION INTRAVENOUS at 08:12

## 2020-10-23 RX ADMIN — KETOROLAC TROMETHAMINE 30 MG: 30 INJECTION, SOLUTION INTRAMUSCULAR at 08:18

## 2020-10-23 NOTE — ANESTHESIA PREPROCEDURE EVALUATION
Anesthesia Evaluation     Patient summary reviewed and Nursing notes reviewed   history of anesthetic complications: PONV  NPO Solid Status: > 8 hours  NPO Liquid Status: > 8 hours           Airway   Mallampati: I  TM distance: >3 FB  Neck ROM: full  No difficulty expected  Dental - normal exam     Pulmonary - negative pulmonary ROS and normal exam   Cardiovascular - negative cardio ROS and normal exam        Neuro/Psych- negative ROS  GI/Hepatic/Renal/Endo - negative ROS     Musculoskeletal (-) negative ROS    Abdominal  - normal exam    Bowel sounds: normal.   Substance History - negative use     OB/GYN negative ob/gyn ROS         Other            Phys Exam Other: Permanent retainer                    Anesthesia Plan    ASA 2     general     intravenous induction     Anesthetic plan, all risks, benefits, and alternatives have been provided, discussed and informed consent has been obtained with: patient.

## 2020-10-23 NOTE — ANESTHESIA PROCEDURE NOTES
Airway  Urgency: elective    Date/Time: 10/23/2020 8:13 AM  Airway not difficult    General Information and Staff    Patient location during procedure: OR  CRNA: Anders Rutherford CRNA    Indications and Patient Condition  Indications for airway management: airway protection    Preoxygenated: yes  Mask difficulty assessment: 1 - vent by mask    Final Airway Details  Final airway type: endotracheal airway      Successful airway: ETT  Cuffed: yes   Successful intubation technique: direct laryngoscopy  Facilitating devices/methods: cricoid pressure  Endotracheal tube insertion site: oral  Blade: Portillo  Blade size: 3  ETT size (mm): 7.0  Cormack-Lehane Classification: grade IIa - partial view of glottis  Placement verified by: chest auscultation   Measured from: lips  Number of attempts at approach: 1  Assessment: lips, teeth, and gum same as pre-op and atraumatic intubation

## 2020-10-30 ENCOUNTER — OFFICE VISIT (OUTPATIENT)
Dept: OBSTETRICS AND GYNECOLOGY | Facility: CLINIC | Age: 30
End: 2020-10-30

## 2020-10-30 VITALS
BODY MASS INDEX: 32.25 KG/M2 | DIASTOLIC BLOOD PRESSURE: 84 MMHG | HEIGHT: 63 IN | WEIGHT: 182 LBS | SYSTOLIC BLOOD PRESSURE: 142 MMHG

## 2020-10-30 DIAGNOSIS — Z09 POSTOP CHECK: Primary | ICD-10-CM

## 2020-10-30 PROBLEM — O02.1 MISSED ABORTION: Status: RESOLVED | Noted: 2019-03-05 | Resolved: 2020-10-30

## 2020-10-30 PROCEDURE — 99024 POSTOP FOLLOW-UP VISIT: CPT | Performed by: MIDWIFE

## 2020-10-30 NOTE — PROGRESS NOTES
"Subjective   Chief Complaint   Patient presents with   • Post-op     7 days post op Dx laparoscopy, ablation of endometriosis, chromopertubation. No complaints     Kely Fink is a 30 y.o. year old  presenting to be seen for her post-operative visit.  Currently she reports no problems with eating, bowel movements, voiding, or wound drainage and pain is well controlled.    The pathology results from her procedure are in Kely's record and are benign.      OTHER COMPLAINTS:  Nothing else    The following portions of the patient's history were reviewed and updated as appropriate:current medications and allergies       Objective   /84   Ht 160 cm (63\")   Wt 82.6 kg (182 lb)   LMP 10/12/2020   BMI 32.24 kg/m²     General:  well developed; well nourished  no acute distress   Abdomen: soft, non-tender; no masses  no umbilical or inguinal hernias are present   Incisions healing, sutures removed   Pelvis: Not performed.          Assessment   1. S/P laparoscopy and laser of endometriosis     Plan   1. May return to full activity with no restrictions  2. Meds were prescribed - no  3. Follow up PRN    No orders of the defined types were placed in this encounter.         This note was electronically signed.  Vashti Verdugo, APRN  2020  "

## 2021-02-23 ENCOUNTER — OFFICE VISIT (OUTPATIENT)
Dept: OBSTETRICS AND GYNECOLOGY | Facility: CLINIC | Age: 31
End: 2021-02-23

## 2021-02-23 VITALS
DIASTOLIC BLOOD PRESSURE: 72 MMHG | HEIGHT: 63 IN | BODY MASS INDEX: 32.43 KG/M2 | WEIGHT: 183 LBS | SYSTOLIC BLOOD PRESSURE: 124 MMHG

## 2021-02-23 DIAGNOSIS — N92.6 ABNORMAL MENSES: Primary | ICD-10-CM

## 2021-02-23 PROCEDURE — 99213 OFFICE O/P EST LOW 20 MIN: CPT | Performed by: OBSTETRICS & GYNECOLOGY

## 2021-02-23 NOTE — PROGRESS NOTES
Subjective   Chief Complaint   Patient presents with   • abnormal menses     pt had HSG 10/2020     Kely Fink is a 31 y.o. year old .  Patient's last menstrual period was 2021.  She presents to be seen because of oligomenorrhea.  Prior to December patient cycles were regular every 26 to 28 days.  She had a delayed cycle in December that was only 2 days in length and very light.  She then had another short light cycle in early January and has had none since.  She is also not had any of the ovulatory signs or symptoms she usually experiences during this time.  She had a laparoscopy in October which showed mild endometriosis no adhesions but good spill through the tube.  Try for pregnancy for many many years..     OTHER COMPLAINTS:  Nothing else    The following portions of the patient's history were reviewed and updated as appropriate:  She  has a past medical history of Bronchitis, Endometriosis, Female infertility, GERD (gastroesophageal reflux disease), Missed ab, Ovarian cyst, PONV (postoperative nausea and vomiting), Reflux esophagitis, and Wears glasses.  She does not have any pertinent problems on file.  She  has a past surgical history that includes [place] tooth extraction; Esophagogastroduodenoscopy; Ovarian Cyst Drainage/Excision (Left, 2018); Oophorectomy (Left); d&c with suction (N/A, 3/5/2019); and Laparoscopy (N/A, 10/23/2020).  Her family history is not on file.  She  reports that she has never smoked. She has never used smokeless tobacco. She reports that she does not drink alcohol or use drugs.  Current Outpatient Medications   Medication Sig Dispense Refill   • HYDROcodone-acetaminophen (Lortab) 5-325 MG per tablet Take 1 tablet by mouth Every 6 (Six) Hours As Needed for Severe Pain . 6 tablet 0   • ibuprofen (ADVIL,MOTRIN) 600 MG tablet Take 1 tablet by mouth Every 6 (Six) Hours As Needed for Moderate Pain . 30 tablet 1     No current facility-administered medications for this  "visit.      Current Outpatient Medications on File Prior to Visit   Medication Sig   • HYDROcodone-acetaminophen (Lortab) 5-325 MG per tablet Take 1 tablet by mouth Every 6 (Six) Hours As Needed for Severe Pain .   • ibuprofen (ADVIL,MOTRIN) 600 MG tablet Take 1 tablet by mouth Every 6 (Six) Hours As Needed for Moderate Pain .     No current facility-administered medications on file prior to visit.      She has No Known Allergies.    Social History    Tobacco Use      Smoking status: Never Smoker      Smokeless tobacco: Never Used    Review of Systems  Consitutional POS: nothing reported    NEG: anorexia or night sweats   Gastointestinal POS: nothing reported    NEG: bloating, change in bowel habits, melena or reflux symptoms   Genitourinary POS: nothing reported    NEG: dysuria or hematuria   Integument POS: nothing reported    NEG: moles that are changing in size, shape, color or rashes   Breast POS: nothing reported    NEG: persistent breast lump, skin dimpling or nipple discharge         Pertinent items are noted in HPI.          Objective   /72   Ht 160 cm (63\")   Wt 83 kg (183 lb)   LMP 01/08/2021   BMI 32.42 kg/m²     General:  well developed; well nourished  no acute distress   Skin:  No suspicious lesions seen   Thyroid: normal to inspection and palpation   Lungs:  breathing is unlabored  clear to auscultation bilaterally   Heart:  Not performed.   Breasts:  Not performed.   Abdomen: soft, non-tender; no masses  no umbilical or inguinal hernias are present  no hepato-splenomegaly   Pelvis: Not performed.     Psychiatric: Alert and oriented ×3, mood and affect appropriate  HEENT: Atraumatic, normocephalic, normal scleral icterus  Extremities: 2+ pulses bilaterally, no edema      Lab Review   No data reviewed    Imaging   Pelvic ultrasound images independantly reviewed - Uterus is 6.5 x 4.5 x 3 cm.  Endometrium 5.3 mm.  Normal right adnexa.  Left adnexa surgically absent.  Trace free fluid.  No " solid masses        Assessment   1. Spaced menses with thin endometrium  2. Inferility with known endometriosis     Plan   1. TSh/PRL/hcg/FSH/E2  Call with results.  I will refer to Jorge  No orders of the defined types were placed in this encounter.         This note was electronically signed.      February 23, 2021

## 2021-02-24 DIAGNOSIS — Z31.9 INFERTILITY MANAGEMENT: Primary | ICD-10-CM

## 2021-02-24 LAB
ESTRADIOL SERPL-MCNC: 23.3 PG/ML
FSH SERPL-ACNC: 12.3 MIU/ML
HCG INTACT+B SERPL-ACNC: <0.5 MIU/ML
PROLACTIN SERPL-MCNC: 17.8 NG/ML (ref 4.8–23.3)
TSH SERPL DL<=0.005 MIU/L-ACNC: 2.23 UIU/ML (ref 0.27–4.2)

## 2021-02-24 NOTE — PROGRESS NOTES
Please let patient know that her hormone levels are normal/negative.  A referral has been placed to Dr. Patel at [facility] in reproductive endocrinology.  Thank you

## 2022-08-22 ENCOUNTER — TELEPHONE (OUTPATIENT)
Dept: OBSTETRICS AND GYNECOLOGY | Facility: CLINIC | Age: 32
End: 2022-08-22

## 2022-08-22 DIAGNOSIS — Z32.00 POSSIBLE PREGNANCY, NOT CONFIRMED: Primary | ICD-10-CM

## 2022-08-22 NOTE — TELEPHONE ENCOUNTER
Have her come in for quantitative hCG and a progesterone level.  We will call her with the results and may repeat these labs again in a couple days or have her follow-up right away.  Thank you

## 2022-08-22 NOTE — TELEPHONE ENCOUNTER
----- Message from Jennifer Mccormick sent at 8/22/2022  3:39 PM EDT -----  Pt found out she was pregnant yesterday. She is [facility]'d with Dr Farah on 09-28. But she miscarried before & said she was told to [facility] but also come in for labs.    Please f/u with pt.

## 2022-08-24 ENCOUNTER — LAB (OUTPATIENT)
Dept: LAB | Facility: HOSPITAL | Age: 32
End: 2022-08-24

## 2022-08-24 DIAGNOSIS — Z32.00 POSSIBLE PREGNANCY, NOT CONFIRMED: ICD-10-CM

## 2022-08-24 LAB — HCG INTACT+B SERPL-ACNC: 686 MIU/ML

## 2022-08-24 PROCEDURE — 36415 COLL VENOUS BLD VENIPUNCTURE: CPT

## 2022-08-24 PROCEDURE — 84702 CHORIONIC GONADOTROPIN TEST: CPT

## 2022-08-24 PROCEDURE — 84144 ASSAY OF PROGESTERONE: CPT

## 2022-08-25 LAB — PROGEST SERPL-MCNC: 22.4 NG/ML

## 2022-08-26 ENCOUNTER — LAB (OUTPATIENT)
Dept: LAB | Facility: HOSPITAL | Age: 32
End: 2022-08-26

## 2022-08-26 DIAGNOSIS — Z32.00 POSSIBLE PREGNANCY, NOT CONFIRMED: Primary | ICD-10-CM

## 2022-08-26 LAB — HCG INTACT+B SERPL-ACNC: 1434 MIU/ML

## 2022-08-26 PROCEDURE — 36415 COLL VENOUS BLD VENIPUNCTURE: CPT

## 2022-08-26 PROCEDURE — 84702 CHORIONIC GONADOTROPIN TEST: CPT

## 2022-08-30 NOTE — PROGRESS NOTES
Please let patient know level went up nicely.  Have her in for a transvaginal sonogram in the next 1 to 2 weeks.

## 2022-09-09 ENCOUNTER — TELEPHONE (OUTPATIENT)
Dept: OBSTETRICS AND GYNECOLOGY | Facility: CLINIC | Age: 32
End: 2022-09-09

## 2022-09-09 DIAGNOSIS — O21.9 NAUSEA AND VOMITING DURING PREGNANCY: Primary | ICD-10-CM

## 2022-09-09 RX ORDER — METOCLOPRAMIDE 10 MG/1
10 TABLET ORAL
Qty: 60 TABLET | Refills: 5 | Status: SHIPPED | OUTPATIENT
Start: 2022-09-09 | End: 2022-09-14

## 2022-09-09 RX ORDER — DIPHENHYDRAMINE HYDROCHLORIDE 25 MG/1
25 CAPSULE ORAL NIGHTLY
Qty: 30 TABLET | Refills: 5 | Status: ON HOLD | OUTPATIENT
Start: 2022-09-09 | End: 2023-03-20

## 2022-09-09 NOTE — TELEPHONE ENCOUNTER
----- Message from Lenka Jones MA sent at 9/9/2022  1:09 PM EDT -----  Patient has NOB appointment next week. Requesting RX for nausea.    Dr Farah    [facility]   [facility]    Thank You

## 2022-09-14 ENCOUNTER — INITIAL PRENATAL (OUTPATIENT)
Dept: OBSTETRICS AND GYNECOLOGY | Facility: CLINIC | Age: 32
End: 2022-09-14

## 2022-09-14 VITALS — BODY MASS INDEX: 31.71 KG/M2 | SYSTOLIC BLOOD PRESSURE: 112 MMHG | DIASTOLIC BLOOD PRESSURE: 62 MMHG | WEIGHT: 179 LBS

## 2022-09-14 DIAGNOSIS — O21.9 NAUSEA AND VOMITING DURING PREGNANCY: ICD-10-CM

## 2022-09-14 DIAGNOSIS — Z01.419 ENCOUNTER FOR GYNECOLOGICAL EXAMINATION (GENERAL) (ROUTINE) WITHOUT ABNORMAL FINDINGS: ICD-10-CM

## 2022-09-14 DIAGNOSIS — O30.041 DICHORIONIC DIAMNIOTIC TWIN PREGNANCY IN FIRST TRIMESTER: Primary | ICD-10-CM

## 2022-09-14 DIAGNOSIS — O36.80X0 ENCOUNTER TO DETERMINE FETAL VIABILITY OF PREGNANCY, SINGLE OR UNSPECIFIED FETUS: ICD-10-CM

## 2022-09-14 PROCEDURE — 99214 OFFICE O/P EST MOD 30 MIN: CPT | Performed by: STUDENT IN AN ORGANIZED HEALTH CARE EDUCATION/TRAINING PROGRAM

## 2022-09-14 NOTE — PROGRESS NOTES
New Pregnancy Visit    Subjective   Chief Complaint   Patient presents with   • Initial Prenatal Visit       Kely Fink is a 32 y.o. . Patient's last menstrual period was 2022.  She presents to initiate prenatal care with our group today.     Kely and her  are nervous but excited regarding the diagnosis of twins. They have been trying to conceive for > 10 years, with one prior missed AB in 2019. This was managed with suction D&C. Her OB/GYN history is otherwise notable for a hemorrhagic cyst in , for which she underwent a laparoscopic LSO. In , she was seen in our clinic by Dr. Daniels for evaluation of pelvic pain and infertility. She then underwent diagnostic laparoscopy, ablation of endometriosis and chromopertubation, revealing mild endo and a patent R fallopian tube. She was ultimately referred to [facility] for further management of infertility but did not go due to lack of insurance coverage. This pregnancy was spontaneously conceived.    She reports some nausea/ vomiting, improved with unisom and B6. Denies VB. No other concerns.    Other pertinent history:   Past Medical History:   Diagnosis Date   • Bronchitis    • Endometriosis    • Female infertility    • GERD (gastroesophageal reflux disease)    • Missed ab    • Ovarian cyst    • PONV (postoperative nausea and vomiting)    • Reflux esophagitis    • Wears glasses       Past Surgical History:   Procedure Laterality Date   • D & C WITH SUCTION N/A 3/5/2019    Procedure: DILATATION AND CURETTAGE WITH SUCTION;  Surgeon: Jordan Mina MD;  Location: [facility];  Service: Obstetrics/Gynecology   • DIAGNOSTIC LAPAROSCOPY N/A 10/23/2020    Procedure: DIAGNOSTIC LAPAROSCOPY, ablation of endometriosis, chromopertubation;  Surgeon: Brendon Daniels MD;  Location: [facility];  Service: Obstetrics/Gynecology;  Laterality: N/A;   • ENDOSCOPY     • OOPHORECTOMY Left    • OVARIAN CYST DRAINAGE/EXCISION Left 2018    Procedure:  LAPAROSCOP WITH LEFT OOPHORECTOMY with tube and cyst; extensive lysis of adhesions;  Surgeon: Jordan Mina MD;  Location: [facility];  Service: Obstetrics/Gynecology   • WISDOM TOOTH EXTRACTION          Social History    Tobacco Use      Smoking status: Never Smoker      Smokeless tobacco: Never Used      Current Outpatient Medications on File Prior to Visit   Medication Sig Dispense Refill   • doxylamine (UNISOM) 25 MG tablet Take 1 tablet by mouth Every Night. 30 tablet 5   • vitamin B-6 (PYRIDOXINE) 25 MG tablet Take 1 tablet by mouth Every Night. 30 tablet 5   • [DISCONTINUED] HYDROcodone-acetaminophen (Lortab) 5-325 MG per tablet Take 1 tablet by mouth Every 6 (Six) Hours As Needed for Severe Pain . 6 tablet 0   • [DISCONTINUED] ibuprofen (ADVIL,MOTRIN) 600 MG tablet Take 1 tablet by mouth Every 6 (Six) Hours As Needed for Moderate Pain . 30 tablet 1   • [DISCONTINUED] metoclopramide (Reglan) 10 MG tablet Take 1 tablet by mouth 3 (Three) Times a Day Before Meals. 60 tablet 5     No current facility-administered medications on file prior to visit.     No Known Allergies      Objective   /62   Wt 81.2 kg (179 lb)   LMP 07/25/2022   BMI 31.71 kg/m²   Physical Exam:  See prenatal physical     Data Review:  Operative reports from 7/24/18, 3/5/19, 10/23/22  Pathology report from 7/24/18 confirming a hemorrhagic cyst/ likely endometrioma     Lab Review:   Hcg quant 8/24/22 and 8/26/22 with appropriate rise.    TSH   Date Value Ref Range Status   02/23/2021 2.230 0.270 - 4.200 uIU/mL Final   06/11/2018 1.979 0.550 - 4.780 mIU/mL Final      Imaging Review:   Dating ultrasound performed today. Images personally reviewed and read as follows -  Impression:   Twin IUP measuring consistent with dates by LMP. Twin A measures 7w4d and Twin B measures 7w3d. Two gestational sacs and lambda sign visualized, consistent with dichorionic, diamniotic twin gestation. Appropriate cardiac activity visualized x 2. JORGE is set  at 22. The cervix and right ovary are normal in appearance. The left ovary is surgically absent. No free fluid in the cul-de-sac.       Assessment & Plan     1. Di/di twins measuring 7w2d by LMP consistent with sono today  2. Routine OB -   o Prenatal labs ordered today  o Pap smear completed today  o Genetic testing reviewed: Kely is interested in cfDNA. Reviewed inability to determine specific twin characteristics with this screening test. Complete at next visit.  o Information reviewed: exercise in pregnancy, nutrition in pregnancy, weight gain in pregnancy, work and travel restrictions during pregnancy, list of OTC medications acceptable in pregnancy and call coverage groups  3. Twin gestation: reviewed increased risk of HG, PTL/ PTD, pre-eclampsia and GDM. Discussed increased surveillance in twin pregnancies, including growth scans q4 wks beginning 20 wks gestation and weekly  testing beginning 36 wks (or sooner for other indications).   4. N/V: continue unisom/ B6 PRN     Diagnosis Plan   1. Dichorionic diamniotic twin pregnancy in first trimester  OB Panel With HIV   2. Encounter to determine fetal viability of pregnancy, single or unspecified fetus  US Ob Transvaginal   3. Encounter for gynecological examination (general) (routine) without abnormal findings  LIQUID-BASED PAP SMEAR, [facility] ([facility])   4. Nausea and vomiting during pregnancy        Follow up: 4 week(s)    Joy Weeks MD  Obstetrics and Gynecology  [facility]

## 2022-09-15 LAB
ABO GROUP BLD: ABNORMAL
BASOPHILS # BLD AUTO: 0.1 X10E3/UL (ref 0–0.2)
BASOPHILS NFR BLD AUTO: 0 %
BLD GP AB SCN SERPL QL: NEGATIVE
EOSINOPHIL # BLD AUTO: 0.1 X10E3/UL (ref 0–0.4)
EOSINOPHIL NFR BLD AUTO: 1 %
ERYTHROCYTE [DISTWIDTH] IN BLOOD BY AUTOMATED COUNT: 12.6 % (ref 11.7–15.4)
HBV SURFACE AG SERPL QL IA: NEGATIVE
HCT VFR BLD AUTO: 36.8 % (ref 34–46.6)
HCV AB S/CO SERPL IA: <0.1 S/CO RATIO (ref 0–0.9)
HGB BLD-MCNC: 12.4 G/DL (ref 11.1–15.9)
HIV 1+2 AB+HIV1 P24 AG SERPL QL IA: NON REACTIVE
IMM GRANULOCYTES # BLD AUTO: 0.1 X10E3/UL (ref 0–0.1)
IMM GRANULOCYTES NFR BLD AUTO: 1 %
LYMPHOCYTES # BLD AUTO: 2.9 X10E3/UL (ref 0.7–3.1)
LYMPHOCYTES NFR BLD AUTO: 21 %
MCH RBC QN AUTO: 29.6 PG (ref 26.6–33)
MCHC RBC AUTO-ENTMCNC: 33.7 G/DL (ref 31.5–35.7)
MCV RBC AUTO: 88 FL (ref 79–97)
MONOCYTES # BLD AUTO: 0.7 X10E3/UL (ref 0.1–0.9)
MONOCYTES NFR BLD AUTO: 5 %
NEUTROPHILS # BLD AUTO: 10.3 X10E3/UL (ref 1.4–7)
NEUTROPHILS NFR BLD AUTO: 72 %
PLATELET # BLD AUTO: 302 X10E3/UL (ref 150–450)
RBC # BLD AUTO: 4.19 X10E6/UL (ref 3.77–5.28)
REF LAB TEST METHOD: NORMAL
RH BLD: POSITIVE
RPR SER QL: NON REACTIVE
RUBV IGG SERPL IA-ACNC: 1.98 INDEX
WBC # BLD AUTO: 14.1 X10E3/UL (ref 3.4–10.8)

## 2022-09-16 LAB — REF LAB TEST METHOD: NORMAL

## 2022-09-27 ENCOUNTER — REFERRAL TRIAGE (OUTPATIENT)
Dept: LABOR AND DELIVERY | Facility: HOSPITAL | Age: 32
End: 2022-09-27

## 2022-10-12 ENCOUNTER — ROUTINE PRENATAL (OUTPATIENT)
Dept: OBSTETRICS AND GYNECOLOGY | Facility: CLINIC | Age: 32
End: 2022-10-12

## 2022-10-12 VITALS — WEIGHT: 176 LBS | SYSTOLIC BLOOD PRESSURE: 112 MMHG | BODY MASS INDEX: 31.18 KG/M2 | DIASTOLIC BLOOD PRESSURE: 60 MMHG

## 2022-10-12 DIAGNOSIS — O30.041 DICHORIONIC DIAMNIOTIC TWIN PREGNANCY IN FIRST TRIMESTER: ICD-10-CM

## 2022-10-12 DIAGNOSIS — Z13.79 GENETIC SCREENING: ICD-10-CM

## 2022-10-12 DIAGNOSIS — Z90.721 HISTORY OF LEFT SALPINGO-OOPHORECTOMY: ICD-10-CM

## 2022-10-12 DIAGNOSIS — Z90.79 HISTORY OF LEFT SALPINGO-OOPHORECTOMY: ICD-10-CM

## 2022-10-12 DIAGNOSIS — Z34.91 PRENATAL CARE IN FIRST TRIMESTER: Primary | ICD-10-CM

## 2022-10-12 PROCEDURE — 99213 OFFICE O/P EST LOW 20 MIN: CPT | Performed by: OBSTETRICS & GYNECOLOGY

## 2022-10-12 RX ORDER — METOCLOPRAMIDE 10 MG/1
TABLET ORAL
Status: ON HOLD | COMMUNITY
Start: 2022-10-03 | End: 2023-03-20

## 2022-10-17 PROBLEM — Z90.79 HISTORY OF LEFT SALPINGO-OOPHORECTOMY: Status: ACTIVE | Noted: 2022-10-17

## 2022-10-17 PROBLEM — Z90.721 HISTORY OF LEFT SALPINGO-OOPHORECTOMY: Status: ACTIVE | Noted: 2022-10-17

## 2022-10-17 NOTE — PROGRESS NOTES
Prenatal Care Visit    Subjective   Chief Complaint   Patient presents with   • Routine Prenatal Visit     No complaints       History:   Kely is a  currently at 11w2d who presents for a prenatal care visit today.    No major issues today.    Social History    Tobacco Use      Smoking status: Never      Smokeless tobacco: Never       Objective   /60   Wt 79.8 kg (176 lb)   LMP 2022   BMI 31.18 kg/m²   Physical Exam:  Normal, gestational age-appropriate exam today        Plan   Medical Decision Making:    I have reviewed the prenatal labs and ultrasound(s) today. I have reviewed the most recent prenatal progress note(s).    Diagnosis: Supervision of high risk pregnancy  Twin pregnancy - dichorionic, diamniotic   Previous LSO   Tests/Orders/Rx today: Orders Placed This Encounter   Procedures   • [id number] PLUS Core+SCA - Blood,     Order Specific Question:   [facility] Date of last menstrual period or estimated date of delivery (corresponding to calculation method):     Answer:   2023     Order Specific Question:   [facility] Gestational age calculation method:     Answer:   JORGE,EDC     Order Specific Question:   Release to patient     Answer:   Routine Release       Medication Management: None     Topics discussed: Prenatal care milestones  Twins   NIPS   Tests next visit: none   Next visit: 4 week(s)     Brody Farah MD  Obstetrics and Gynecology  [facility]

## 2022-10-18 LAB
CFDNA.FET/CFDNA.TOTAL SFR FETUS: ABNORMAL %
CITATION REF LAB TEST: ABNORMAL
FET 13+18+21+X+Y ANEUP PLAS.CFDNA: ABNORMAL
FET CHR 21 TS PLAS.CFDNA QL: ABNORMAL
FET MS X RISK WBC.DNA+CFDNA QL: ABNORMAL
FET SEX PLAS.CFDNA DOSAGE CFDNA: ABNORMAL
FET TS 13 RISK PLAS.CFDNA QL: ABNORMAL
FET TS 18 RISK WBC.DNA+CFDNA QL: ABNORMAL
FET X + Y ANEUP RISK PLAS.CFDNA SEQ-IMP: ABNORMAL
GA EST FROM CONCEPTION DATE: ABNORMAL D
GESTATIONAL AGE > 9:: ABNORMAL
LAB DIRECTOR NAME PROVIDER: ABNORMAL
LAB DIRECTOR NAME PROVIDER: ABNORMAL
LABORATORY COMMENT REPORT: ABNORMAL
LIMITATIONS OF THE TEST: ABNORMAL
NEGATIVE PREDICTIVE VALUE: ABNORMAL
NOTE: ABNORMAL
PERFORMANCE CHARACTERISTICS: ABNORMAL
POSITIVE PREDICTIVE VALUE: ABNORMAL
REF LAB TEST METHOD: ABNORMAL
TEST PERFORMANCE INFO SPEC: ABNORMAL

## 2022-10-28 LAB
5P15 DELETION (CRI-DU-CHAT): NOT DETECTED
CFDNA.FET/CFDNA.TOTAL SFR FETUS: NORMAL %
CITATION REF LAB TEST: NORMAL
FET 13+18+21+X+Y ANEUP PLAS.CFDNA: NEGATIVE
FET 1P36 DEL RISK WBC.DNA+CFDNA QL: NOT DETECTED
FET 22Q11.2 DEL RISK WBC.DNA+CFDNA QL: NOT DETECTED
FET CHR 11Q23 DEL PLAS.CFDNA QL: NOT DETECTED
FET CHR 15Q11 DEL PLAS.CFDNA QL: NOT DETECTED
FET CHR 21 TS PLAS.CFDNA QL: NEGATIVE
FET CHR 4P16 DEL PLAS.CFDNA QL: NOT DETECTED
FET CHR 8Q24 DEL PLAS.CFDNA QL: NOT DETECTED
FET SEX PLAS.CFDNA DOSAGE CFDNA: NORMAL
FET TS 13 RISK PLAS.CFDNA QL: NEGATIVE
FET TS 18 RISK WBC.DNA+CFDNA QL: NEGATIVE
GA EST FROM CONCEPTION DATE: NORMAL D
GESTATIONAL AGE > 9:: YES
LAB DIRECTOR NAME PROVIDER: NORMAL
LAB DIRECTOR NAME PROVIDER: NORMAL
LABORATORY COMMENT REPORT: NORMAL
LIMITATIONS OF THE TEST: NORMAL
NEGATIVE PREDICTIVE VALUE: NORMAL
NOTE: NORMAL
PERFORMANCE CHARACTERISTICS: NORMAL
POSITIVE PREDICTIVE VALUE: NORMAL
REF LAB TEST METHOD: NORMAL
TEST PERFORMANCE INFO SPEC: NORMAL
TRIOSOMY 16: NOT DETECTED
TRISOMY 22: NOT DETECTED

## 2022-11-07 ENCOUNTER — ROUTINE PRENATAL (OUTPATIENT)
Dept: OBSTETRICS AND GYNECOLOGY | Facility: CLINIC | Age: 32
End: 2022-11-07

## 2022-11-07 VITALS — DIASTOLIC BLOOD PRESSURE: 60 MMHG | WEIGHT: 176 LBS | BODY MASS INDEX: 31.18 KG/M2 | SYSTOLIC BLOOD PRESSURE: 116 MMHG

## 2022-11-07 DIAGNOSIS — Z90.721 HISTORY OF LEFT SALPINGO-OOPHORECTOMY: ICD-10-CM

## 2022-11-07 DIAGNOSIS — O21.9 NAUSEA AND VOMITING DURING PREGNANCY: ICD-10-CM

## 2022-11-07 DIAGNOSIS — O30.042 DICHORIONIC DIAMNIOTIC TWIN PREGNANCY IN SECOND TRIMESTER: ICD-10-CM

## 2022-11-07 DIAGNOSIS — K21.9 GASTROESOPHAGEAL REFLUX DISEASE WITHOUT ESOPHAGITIS: ICD-10-CM

## 2022-11-07 DIAGNOSIS — Z90.79 HISTORY OF LEFT SALPINGO-OOPHORECTOMY: ICD-10-CM

## 2022-11-07 DIAGNOSIS — O36.8390 UNABLE TO HEAR FETAL HEART TONES AS REASON FOR ULTRASOUND SCAN: ICD-10-CM

## 2022-11-07 DIAGNOSIS — O09.92 ENCOUNTER FOR SUPERVISION OF HIGH RISK PREGNANCY IN SECOND TRIMESTER, ANTEPARTUM: Primary | ICD-10-CM

## 2022-11-07 PROCEDURE — 99214 OFFICE O/P EST MOD 30 MIN: CPT | Performed by: OBSTETRICS & GYNECOLOGY

## 2022-11-07 RX ORDER — FAMOTIDINE 20 MG/1
20 TABLET, FILM COATED ORAL 2 TIMES DAILY
Qty: 60 TABLET | Refills: 5 | Status: SHIPPED | OUTPATIENT
Start: 2022-11-07 | End: 2023-03-29 | Stop reason: HOSPADM

## 2022-11-07 NOTE — PROGRESS NOTES
Chief Complaint  Routine Prenatal Visit (No complaints. )    History of Present Illness:  Kely is a  currently at 15w0d who presents today with complaints of continued nausea although improved.  Patient has not been taking her Reglan.  She tried going without her vitamin B6 and Unisom but had to resume her medication.  Patient does report having reflux as well.  She has not been on any medications.  Patient did have genetic screening last visit as noted.  Patient has not felt any fetal movement.  She denies any cramping or contractions.  She denies any vaginal bleeding.  Patient has tried to conceive for 10 years.    Exam:  Vitals:  See prenatal flowsheet as noted and reviewed  General: Alert, cooperative, and does not appear in any distress  Abdomen:   See prenatal flowsheet as noted and reviewed    Uterus gravid, non-tender; no palpable masses    No guarding or rebound tenderness  Pelvic:  See prenatal flowsheet as noted and reviewed  Ext:  See prenatal flowsheet as noted and reviewed    Moves extremities well, no cyanosis and no redness  Urine:  See prenatal flowsheet as noted and reviewed    Data Review:  The following data was reviewed by: Ida Hall MD on 2022:  Prenatal Labs:  Lab Results   Component Value Date    HGB 12.4 2022    RUBELLAABIGG 1.98 2022    HEPBSAG Negative 2022    ABO A 2022    RH Positive 2022    ABSCRN Negative 2022    LAR9AWA1 Non Reactive 2022    HEPCVIRUSABY <0.1 2022       Orders Only on 10/12/2022   Component Date Value   • Gestation 10/12/2022 Twins    • Fetal Fraction 10/12/2022 8%    • Gestational Age >9: 10/12/2022 Yes    • Result 10/12/2022 Negative    •  Comments 10/12/2022 Comment    • Approved By 10/12/2022 Comment    • TRISOMY 21 (DOWN SYNDROM* 10/12/2022 Negative    • TRISOMY 18 (KAT SYND* 10/12/2022 Negative    • TRISOMY 13 (PATAU SYNDRO* 10/12/2022 Negative    • FETAL SEX 10/12/2022 Comment     • 22Q11 DELETION (DIGEORGE) 10/12/2022 Not Detected    • 15Q11 DELETION (PW LUCIA* 10/12/2022 Not Detected    • 11Q23 DELETION (HOSSEIN) 10/12/2022 Not Detected    • 8Q24 DELETION (OLESYA-GI* 10/12/2022 Not Detected    • 5P15 DELETION (Cri-du-ch* 10/12/2022 Not Detected    • 4P16 DELETION (HUBER-HIRS* 10/12/2022 Not Detected    • 1P36 DELETION SYNDROME 10/12/2022 Not Detected    • TRIOSOMY 16 10/12/2022 Not Detected    • TRISOMY 22 10/12/2022 Not Detected    • NEGATIVE PREDICTIVE VALUE 10/12/2022 Note    • POSITIVE PREDICTIVE VALUE 10/12/2022 N/A    • About The Test 10/12/2022 Comment    • Test Method 10/12/2022 Comment    • Performance 10/12/2022 Comment    • PERFORMANCE CHARACTERIST* 10/12/2022 Note    • Limitations of the Test 10/12/2022 Comment    • Note 10/12/2022 Comment    • References 10/12/2022 Comment    Routine Prenatal on 10/12/2022   Component Date Value   • Gestation 10/12/2022 CANCELED    • Fetal Fraction 10/12/2022 CANCELED    • Gestational Age >9: 10/12/2022 CANCELED    • Result 10/12/2022 CANCELED (A)    •  Comments 10/12/2022 CANCELED    • Approved By 10/12/2022 CANCELED    • TRISOMY 21 (DOWN SYNDROM* 10/12/2022 CANCELED    • TRISOMY 18 (KAT SYND* 10/12/2022 CANCELED    • TRISOMY 13 (PATAU SYNDRO* 10/12/2022 CANCELED    • FETAL SEX 10/12/2022 CANCELED    • MONOSOMY X (CORRALES SYNDR* 10/12/2022 CANCELED    • XYY (NAVARRETE SYNDROME) 10/12/2022 CANCELED    • XXY (KLINEFELTER SYNDROM* 10/12/2022 CANCELED    • XXX (TRIPLE X SYNDROME) 10/12/2022 CANCELED    • NEGATIVE PREDICTIVE VALUE 10/12/2022 CANCELED    • POSITIVE PREDICTIVE VALUE 10/12/2022 CANCELED    • About The Test 10/12/2022 CANCELED    • Test Method 10/12/2022 CANCELED    • Performance 10/12/2022 CANCELED    • PERFORMANCE CHARACTERIST* 10/12/2022 CANCELED    • Limitations of the Test 10/12/2022 CANCELED    • Note 10/12/2022 CANCELED    • References 10/12/2022 CANCELED      Imaging:  US Ob Limited 1 + Fetuses  Kely Fink  :  1990  MRN: [id number]  Date: 2022    Reason for exam/History: Fetal heart tones, twin gestation    Ultrasound images are reviewed.  There is noted to be a viable   intrauterine twin gestation.  Fetus a is in the vertex presentation with   fetal heart rate of 141 bpm.  Fetus B is in the breech presentation with   fetal heart rate of 141 bpm.  Both fetuses are active.    The exam limitations noted:  none    See ultrasound report for measurements and structures identified.    Ida Hall MD, [facility]  OB G[facility] [place]     Medical Records:  None    Assessment and Plan:  Problem List Items Addressed This Visit        Genitourinary and Reproductive     History of left salpingo-oophorectomy       Gravid and     Dichorionic diamniotic twin pregnancy in second trimester   Other Visit Diagnoses     Encounter for supervision of high risk pregnancy in second trimester, antepartum    -  Primary  Topics discussed:     ab precautions  GERD management  kick counts and fetal movement  PIH precautions  Patient is to return for anatomic scan as noted.  Ultrasound for fetal heart tones today.    Relevant Orders    US Ob 14 + Weeks Single or First Gestation    US Ob 14 + Weeks Each Additional Gestation    Unable to hear fetal heart tones as reason for ultrasound scan      Scan today as noted.    Nausea and vomiting during pregnancy      Patient is to continue her Unisom and vitamin B6 as discussed.  Prescription is also given for Pepcid.    Gastroesophageal reflux disease without esophagitis      Prescription is given for Pepcid as noted.  Patient is instructed to call if no improvement and/or changes in her symptoms.    Relevant Medications    famotidine (PEPCID) 20 MG tablet        Follow Up/Instructions:  Follow up as scheduled.  Patient was given instructions and counseling regarding her condition or for health maintenance advice. Please see specific information pulled into the AVS if  appropriate.     Note: Speech recognition transcription software may have been used to dictate portions of this document.  An attempt at proofreading has been made though minor errors in transcription may still be present.    This note was electronically signed.  Ida Hall M.D.

## 2022-11-15 ENCOUNTER — PATIENT OUTREACH (OUTPATIENT)
Dept: LABOR AND DELIVERY | Facility: HOSPITAL | Age: 32
End: 2022-11-15

## 2022-11-15 NOTE — OUTREACH NOTE
[facility]  Unable to Reach       Questions/Answers    Flowsheet Row Responses   Pending Outreach Confirm Patient Interest   Call Attempt First   Outcome Left message   Next Call Attempt Date 11/22/22   Unable to reach comments: Left patient VM.            Teddy Francois, RN  Maternity Nurse Navigator    11/15/2022, 13:56 EST

## 2022-11-29 ENCOUNTER — ROUTINE PRENATAL (OUTPATIENT)
Dept: OBSTETRICS AND GYNECOLOGY | Facility: CLINIC | Age: 32
End: 2022-11-29

## 2022-11-29 VITALS — WEIGHT: 179 LBS | SYSTOLIC BLOOD PRESSURE: 122 MMHG | DIASTOLIC BLOOD PRESSURE: 70 MMHG | BODY MASS INDEX: 31.71 KG/M2

## 2022-11-29 DIAGNOSIS — O30.042 DICHORIONIC DIAMNIOTIC TWIN PREGNANCY IN SECOND TRIMESTER: Primary | ICD-10-CM

## 2022-11-29 DIAGNOSIS — Z36.89 ENCOUNTER FOR FETAL ANATOMIC SURVEY: ICD-10-CM

## 2022-11-29 PROCEDURE — 99213 OFFICE O/P EST LOW 20 MIN: CPT | Performed by: MIDWIFE

## 2022-11-29 NOTE — PROGRESS NOTES
Chief Complaint   Patient presents with   • Routine Prenatal Visit     No Complaints/concerns        HPI: Kely is a  currently at 18w1d here for prenatal visit who reports the following:  She has been having some lower abdominal groin discomfort. She works as a  and is up on her feet a lot.                EXAM:     Vitals:    22 1217   BP: 122/70      Abdomen:   See prenatal flowsheet as noted and reviewed, soft, nontender   Pelvic:  See prenatal flowsheet as noted and reviewed   Urine:  See prenatal flowsheet as noted and reviewed    Lab Results   Component Value Date    ABO A 2022    RH Positive 2022    ABSCRN Negative 2022       MDM:  Impression: Low-lying placenta-Twin A  Twin pregnancy - dichorionic, diamniotic  Round ligament pain   Tests done today: U/S for anatomic screening - anatomy completely seen today   Topics discussed: kick counts and fetal movement  low-lying placenta/previa precautions  Maternity belt/support garments  Reviewed OB labs   Tests next visit: U/S for f/u of placental location                RTO:                        4 weeks    This note was electronically signed.  Vashti Verdugo, APRN  2022

## 2022-12-06 ENCOUNTER — ROUTINE PRENATAL (OUTPATIENT)
Dept: OBSTETRICS AND GYNECOLOGY | Facility: CLINIC | Age: 32
End: 2022-12-06

## 2022-12-06 VITALS — DIASTOLIC BLOOD PRESSURE: 68 MMHG | WEIGHT: 179 LBS | SYSTOLIC BLOOD PRESSURE: 118 MMHG | BODY MASS INDEX: 31.71 KG/M2

## 2022-12-06 DIAGNOSIS — R39.9 UTI SYMPTOMS: Primary | ICD-10-CM

## 2022-12-06 DIAGNOSIS — Z34.82 ENCOUNTER FOR SUPERVISION OF OTHER NORMAL PREGNANCY IN SECOND TRIMESTER: ICD-10-CM

## 2022-12-06 DIAGNOSIS — O30.049 TWIN PREGNANCY, DICHORIONIC/DIAMNIOTIC, UNSPECIFIED TRIMESTER: ICD-10-CM

## 2022-12-06 PROBLEM — O30.041 DICHORIONIC DIAMNIOTIC TWIN PREGNANCY IN FIRST TRIMESTER: Status: RESOLVED | Noted: 2022-09-14 | Resolved: 2022-12-06

## 2022-12-06 LAB
KETONES UR QL: ABNORMAL
NITRITE UR-MCNC: NEGATIVE MG/ML
PROT UR STRIP-MCNC: ABNORMAL MG/DL

## 2022-12-06 PROCEDURE — 99214 OFFICE O/P EST MOD 30 MIN: CPT | Performed by: MIDWIFE

## 2022-12-06 RX ORDER — NITROFURANTOIN 25; 75 MG/1; MG/1
100 CAPSULE ORAL 2 TIMES DAILY
Qty: 14 CAPSULE | Refills: 0 | Status: SHIPPED | OUTPATIENT
Start: 2022-12-06 | End: 2022-12-13

## 2022-12-06 NOTE — PROGRESS NOTES
Chief Complaint   Patient presents with   • Routine Prenatal Visit     UTI Symptoms        HPI: Kely is a  currently at 19w1d here for prenatal visit who reports the following:  She has been having dysuria after voiding for several days. She has also had mild pelvic pressure. She denies any unusual vaginal discharge. She doesn't drink a lot of fluids during the day.                EXAM:     Vitals:    22 1106   BP: 118/68      Abdomen:   See prenatal flowsheet as noted and reviewed, soft, nontender   Pelvic:  See prenatal flowsheet as noted and reviewed   Urine:  See prenatal flowsheet as noted and reviewed    Lab Results   Component Value Date    ABO A 2022    RH Positive 2022    ABSCRN Negative 2022       MDM:  Impression: Twin pregnancy - dichorionic, diamniotic   Tests done today: UA with culture   Topics discussed: kick counts and fetal movement  Discussed fluid intake   cfDNA  Macrobid BID x 7 days  Reviewed OB labs   Tests next visit: none                RTO:                      As scheduled    This note was electronically signed.  CRISTOBAL Wilhelm  2022

## 2022-12-12 ENCOUNTER — TELEPHONE (OUTPATIENT)
Dept: OBSTETRICS AND GYNECOLOGY | Facility: CLINIC | Age: 32
End: 2022-12-12

## 2022-12-12 NOTE — TELEPHONE ENCOUNTER
DELETE AFTER REVIEWING: Telephone encounter to be sent to the clinical pool     Caller: Kely Fink    Relationship: Self    Best call back number: [phone]    What medications are you currently taking:   Current Outpatient Medications on File Prior to Visit   Medication Sig Dispense Refill   • doxylamine (UNISOM) 25 MG tablet Take 1 tablet by mouth Every Night. 30 tablet 5   • famotidine (PEPCID) 20 MG tablet Take 1 tablet by mouth 2 (Two) Times a Day. 60 tablet 5   • metoclopramide (REGLAN) 10 MG tablet      • nitrofurantoin, macrocrystal-monohydrate, (MACROBID) 100 MG capsule Take 1 capsule by mouth 2 (Two) Times a Day for 7 days. 14 capsule 0   • Prenatal Vit-Fe Fumarate-FA (PRENATAL VITAMINS PO) Take 1 capsule by mouth Daily.     • vitamin B-6 (PYRIDOXINE) 25 MG tablet Take 1 tablet by mouth Every Night. 30 tablet 5     No current facility-administered medications on file prior to visit.            Which medication are you concerned about: PATIENT WAS JUST PRESCRIBED SUDAFED FOR PT'S VIRAL INFECTION BY HER PCP - PATIENT IS CALLING AND WANTING TO CHECK THAT IT IS SAFE TO TAKE WITH HER CURRENT MEDS.     PATIENT DOES REQUEST A CALL BACK, OKAY WITH M

## 2022-12-13 LAB
BACTERIA UR CULT: NORMAL
BACTERIA UR CULT: NORMAL
CFTR MUT ANL BLD/T: NORMAL
LABORATORY COMMENT REPORT: NORMAL

## 2022-12-28 ENCOUNTER — ROUTINE PRENATAL (OUTPATIENT)
Dept: OBSTETRICS AND GYNECOLOGY | Facility: CLINIC | Age: 32
End: 2022-12-28
Payer: COMMERCIAL

## 2022-12-28 VITALS — BODY MASS INDEX: 33.3 KG/M2 | SYSTOLIC BLOOD PRESSURE: 122 MMHG | DIASTOLIC BLOOD PRESSURE: 64 MMHG | WEIGHT: 188 LBS

## 2022-12-28 DIAGNOSIS — Z34.92 PRENATAL CARE IN SECOND TRIMESTER: Primary | ICD-10-CM

## 2022-12-28 DIAGNOSIS — O44.40 LOW-LYING PLACENTA: ICD-10-CM

## 2022-12-28 DIAGNOSIS — O30.049 TWIN PREGNANCY, DICHORIONIC/DIAMNIOTIC, UNSPECIFIED TRIMESTER: ICD-10-CM

## 2022-12-28 PROCEDURE — 99213 OFFICE O/P EST LOW 20 MIN: CPT | Performed by: OBSTETRICS & GYNECOLOGY

## 2023-01-05 NOTE — PROGRESS NOTES
Prenatal Care Visit    Subjective   Chief Complaint   Patient presents with   • Routine Prenatal Visit     Scan done today, pressure and back pain.       History:   Kely is a  currently at 22w2d who presents for a prenatal care visit today.    No major issues today.    Social History    Tobacco Use      Smoking status: Never      Smokeless tobacco: Never       Objective   /64   Wt 85.3 kg (188 lb)   LMP 2022   BMI 33.30 kg/m²   Physical Exam:  Normal, gestational age-appropriate exam today        Plan   Medical Decision Making:    I have reviewed the prenatal labs and ultrasound(s) today. I have reviewed the most recent prenatal progress note(s).    Diagnosis: Supervision of high risk pregnancy  Twin pregnancy - dichorionic, diamniotic   Low-lying placenta, resolved  Previous LSO   Tests/Orders/Rx today: Orders Placed This Encounter   Procedures   • US Ob Transvaginal     Order Specific Question:   Reason for Exam:     Answer:   f/u low lying placenta       Medication Management: None     Topics discussed: Prenatal care milestones  U/S findings    Tests next visit: GCT  HgB   Next visit: 4 week(s)     Brody Farah MD  Obstetrics and Gynecology  [facility]

## 2023-01-25 ENCOUNTER — TELEPHONE (OUTPATIENT)
Dept: OBSTETRICS AND GYNECOLOGY | Facility: CLINIC | Age: 33
End: 2023-01-25
Payer: COMMERCIAL

## 2023-01-25 NOTE — TELEPHONE ENCOUNTER
Patient called and states she has taken a trip to [place] and has had some pressure and bleeding.  I advised patient to go to the local hospital to be checked.

## 2023-01-27 ENCOUNTER — ROUTINE PRENATAL (OUTPATIENT)
Dept: OBSTETRICS AND GYNECOLOGY | Facility: CLINIC | Age: 33
End: 2023-01-27
Payer: COMMERCIAL

## 2023-01-27 VITALS — SYSTOLIC BLOOD PRESSURE: 120 MMHG | BODY MASS INDEX: 34.9 KG/M2 | DIASTOLIC BLOOD PRESSURE: 70 MMHG | WEIGHT: 197 LBS

## 2023-01-27 DIAGNOSIS — N93.9 VAGINAL BLEEDING: ICD-10-CM

## 2023-01-27 DIAGNOSIS — O30.049 TWIN PREGNANCY, DICHORIONIC/DIAMNIOTIC, UNSPECIFIED TRIMESTER: ICD-10-CM

## 2023-01-27 DIAGNOSIS — R60.0 BILATERAL LEG EDEMA: ICD-10-CM

## 2023-01-27 DIAGNOSIS — O09.92 ENCOUNTER FOR SUPERVISION OF HIGH RISK PREGNANCY IN SECOND TRIMESTER, ANTEPARTUM: Primary | ICD-10-CM

## 2023-01-27 PROCEDURE — 99214 OFFICE O/P EST MOD 30 MIN: CPT | Performed by: OBSTETRICS & GYNECOLOGY

## 2023-01-27 NOTE — PROGRESS NOTES
Chief Complaint  Routine Prenatal Visit (Patient advised seen in ER on Wednesday in [place] for spotting. )    History of Present Illness:  Kely is a  currently at 26w4d who presents today with complaints of vaginal bleeding.  Patient reports she was in [place].  She had been walking but not doing any heavy lifting or straining.  She had previously been having cramping.  Patient had the onset of spotting.  She was seen on labor and delivery and severe level.  Patient was monitored.  She did not have an ultrasound.  She did have a UA as noted.  She was discharged to home.  Patient reports she has not had any further spotting.  She does report good fetal movement x2.  She has been taking her prenatal vitamins as well as Pepcid.  Patient did have scan last visit in regards to her placenta.  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  She reports she stands the majority of the time.  Patient has been having lower extremity edema bilaterally.  She denies any headaches or visual disturbances.    Exam:  Vitals:  See prenatal flowsheet as noted and reviewed  General: Alert, cooperative, and does not appear in any distress  Abdomen:   See prenatal flowsheet as noted and reviewed    Uterus gravid, non-tender; no palpable masses    No guarding or rebound tenderness  Pelvic:  See prenatal flowsheet as noted and reviewed  Ext:  See prenatal flowsheet as noted and reviewed    Moves extremities well, no cyanosis and no redness  Urine:  See prenatal flowsheet as noted and reviewed    Data Review:  The following data was reviewed by: Ida Hall MD on 2023:  Prenatal Labs:  Lab Results   Component Value Date    HGB 12.4 2022    RUBELLAABIGG 1.98 2022    HEPBSAG Negative 2022    ABO A 2022    RH Positive 2022    ABSCRN Negative 2022    PVH9EHU1 Non Reactive 2022    HEPCVIRUSABY <0.1 2022    URINECX Final report 2022       No visits with results within 1 Month(s) from  this visit.   Latest known visit with results is:   Routine Prenatal on 2022   Component Date Value   • Ketones, UA 2022 2+ (A)    • Protein, POC 2022 2+ (A)    • Nitrite, UA 2022 Negative    • Urine Culture 2022 Final report    • Result 1 2022 Comment    • CF, Screen 2022 Comment:    • Comment 2022 Comment      Imaging:  US Ob Transvaginal  Vikas Fink  : 1990  MRN: [id number]  Date: 2023    Reason for exam/History: Vaginal bleeding, twins    Ultrasound images are reviewed.  There is noted to be a viable twin   intrauterine pregnancy.   Fetal heart rate of twin A is 144 bpm.  Fetal   heart rate of twin B is 141 bpm.  Twin A is in the vertex presentation.    Twin B is in the breech presentation.  Placenta is posterior and is 4.5 cm   away from the cervix.  The cervical length was noted to be 3.3 cms.    The exam limitations noted:  none    See ultrasound report for measurements and structures identified.    Ida Hall MD, [facility]  OB GYN [place]     Medical Records:  Summary of episode note  2023  Continuity of Care Document      VIKAS FINK - 33 y.o. Female; born [birth year]Continuity of Care Document, generated on 2023January 2023   Encounter    23 - 23  [address] [id number]    Discharge Disposition: **Home or Self Care 01  Attending Physician: HERNANDEZ ASIF DO  Admitting Physician: HERNANDEZ ASIF DO      Allergies, Adverse Reactions, Alerts    No Known Medication Allergies    Assessment and Plan    No data available for this section    Functional Status    23    Functional Status  Sensory Deficits None      Immunizations    No data available for this section    Medications    Prenatal 19 ([place]) oral tablet, chewable  0 Refill(s)  Start Date: 23  Status: Ordered    Mental Status    No data  available for this section    Problem List  Reconcile with Patient's Chart  Problem List  Condition Confirmation Course Effective Dates Status Health Status Informant   Pregnancy Confirmed   8/21/22 Active         Procedures    No data available for this section    Results    Laboratory List    Results  Name Date   Urinalysis with Culture/Microscopic, if indicated 1/25/23          Results  Most recent to oldest [Reference Range]: 1   UA Color [Yellow] Light-Yellow   (1/25/23 5:47 PM)    UA Urobilinogen [Normal mg/dL] Normal mg/dL   (1/25/23 5:47 PM)    UA Bili [Negative] Negative   (1/25/23 5:47 PM)    UA Ketones [Negative] Negative   (1/25/23 5:47 PM)    UA Leuk Est [Negative Gabriele/uL] 25 Gabriele/uL   (1/25/23 5:47 PM)    UA Nitrite [Negative] Negative   (1/25/23 5:47 PM)    UA Glucose [Negative] Trace   *ABN*  (1/25/23 5:47 PM)    UA Protein [Negative] Negative   (1/25/23 5:47 PM)    UA Blood [Negative] Negative   (1/25/23 5:47 PM)    UA Spec Grav [1.005-1.030] 1.014   (1/25/23 5:47 PM)    UA pH [4.5-8.0 pH Units] 7.0 pH Units   (1/25/23 5:47 PM)    UA Appear [Clear] Clear   (1/25/23 5:47 PM)    Transcribed Chlamydia Unknown   (1/25/23 5:32 PM)    Transcribed Gonorrhea Unknown   (1/25/23 5:32 PM)    Transcribed Trichomoniasis Unknown   (1/25/23 5:32 PM)    Transcribed Hepatitis C Unknown   (1/25/23 5:32 PM)    Drug Abuse During Pregnancy: No   (1/25/23 5:32 PM)    Transcribed HSV Type 2 Unknown   (1/25/23 5:32 PM)    Transcribed Blood Type Unknown   (1/25/23 5:32 PM)    Transcribed Rubella Unknown   (1/25/23 5:32 PM)    Transcribed Group B Strep Unknown   (1/25/23 5:32 PM)    Transcribed Hepatitis B Unknown   (1/25/23 5:32 PM)    Transcribed RPR Unknown   (1/25/23 5:32 PM)    Transcribed HSV Type 1 Unknown   (1/25/23 5:32 PM)    Transcribed Toxicology Screen N/A   (1/25/23 5:32 PM)    Transcribed HIV Antibodies Unknown   (1/25/23 5:32 PM)    Transcribed Antepartum Steroids None   (1/25/23 5:32 PM)    Transcribed HSV  (Serology) Unknown   (1/25/23 5:32 PM)       Orders for Microbiology Reports    Results  Name Date   Wet Prep 1/25/23   Microbiology Reports         TEST: Wet Prep  STATUS: Auth (Verified)  BODY SITE:   SOURCE: Cervical  COLLECTED DATE/TIME: 1/25/23 5:47 PM    FINAL REPORT    Few white blood cells seen   Negative for yeast and Trichomonas vaginalis trophozoites   No Clue Cells Seen    Vital Signs    Vital Signs  Most recent to oldest [Reference Range]: 1 2   Temperature Oral [36-38 Deg C] 36.5 Deg C   (1/25/23 5:45 PM) 36.4 Deg C   (1/25/23 5:01 PM)   Temperature Oral F [96.8-100.4 Deg F] 97.7 Deg F   (1/25/23 5:45 PM) 97.5 Deg F   (1/25/23 5:01 PM)   Peripheral Pulse Rate [ bpm] 106 bpm   *HI*  (1/25/23 5:01 PM)     Heart Rate Monitored [ bpm] 93 bpm   (1/25/23 6:20 PM)     Respiratory Rate [14-20 br/min] 18 br/min   (1/25/23 5:01 PM)     Blood Pressure [/90 mmHg] 122/71 mmHg   (1/25/23 6:20 PM) 150/74 mmHg   *HI*  (1/25/23 5:01 PM)   Mean Arterial Pressure, Cuff [ mmHg] 88 mmHg   (1/25/23 6:20 PM) 99 mmHg   (1/25/23 5:01 PM)   Body Mass Index Measured 30.7 kg/m2   (1/25/23 5:32 PM)     BSA Measured 1.92 m2   (1/25/23 5:32 PM)     Height Method Type Patient Stated   (1/25/23 5:32 PM)     Height/Length Measured [124.5-243.8 cm] 163 cm   (1/25/23 5:32 PM)     Ideal Body Weight Calculated 55.1 kg   (1/25/23 5:32 PM)     Weight Dosing 81.6 kg   (1/25/23 5:32 PM)     Weight Method Type Patient Stated   (1/25/23 5:32 PM)       Social History    Social History  Social History Type Response   Tobacco Tobacco use: Never tobacco user.   Birth Sex Female     Health Concerns    No data available for this section    Implantable Device List    No data available for this section    Hospital Discharge Instructions    Patient Education    01/25/2023 18:20:47    Signs and Symptoms of Labor    Signs and Symptoms of Labor  Labor is the body's natural process of moving the baby and the placenta out of the  "uterus. The process of labor usually starts when the baby is full-term, between 39 and 41 weeks of pregnancy.  Signs and symptoms that you are close to going into labor  As your body prepares for labor and the birth of your baby, you may notice the following symptoms in the weeks and days before true labor starts:  • Passing a small amount of thick, bloody mucus from your vagina. This is called normal bloody show or losing your mucus plug. This may happen more than a week before labor begins, or right before labor begins, as the opening of the cervix starts to widen (dilate). For some women, the entire mucus plug passes at once. For others, pieces of the mucus plug may gradually pass over several days.  • Your baby moving (dropping) lower in your pelvis to get into position for birth (lightening). When this happens, you may feel more pressure on your bladder and pelvic bone and less pressure on your ribs. This may make it easier to breathe. It may also cause you to need to urinate more often and have problems with bowel movements.  • Having \"practice contractions,\" also called [place] contractions or false labor. These occur at irregular (unevenly spaced) intervals that are more than 10 minutes apart. False labor contractions are common after exercise or sexual activity. They will stop if you change position, rest, or drink fluids. These contractions are usually mild and do not get stronger over time. They may feel like:  ? A backache or back pain.  ? Mild cramps, similar to menstrual cramps.  ? Tightening or pressure in your abdomen.  Other early symptoms include:  • Nausea or loss of appetite.  • Diarrhea.  • Having a sudden burst of energy, or feeling very tired.  • Mood changes.  • Having trouble sleeping.  Signs and symptoms that labor has begun  Signs that you are in labor may include:  • Having contractions that come at regular (evenly spaced) intervals and increase in intensity. This may feel like more " intense tightening or pressure in your abdomen that moves to your back.  ? Contractions may also feel like rhythmic pain in your upper thighs or back that comes and goes at regular intervals.  ? If you are delivering for the first time, this change in intensity of contractions often occurs at a more gradual pace.  ? If you have given birth before, you may notice a more rapid progression of contraction changes.  • Feeling pressure in the vaginal area.  • Your water breaking (rupture of membranes). This is when the sac of fluid that surrounds your baby breaks. Fluid leaking from your vagina may be clear or blood-tinged. Labor usually starts within 24 hours of your water breaking, but it may take longer to begin.  ? Some people may feel a sudden gush of fluid; others may notice repeatedly damp underwear.  Follow these instructions at home:  • When labor starts, or if your water breaks, call your health care provider or nurse care line. Based on your situation, they will determine when you should go in for an exam.  • During early labor, you may be able to rest and manage symptoms at home. Some strategies to try at home include:  ? Breathing and relaxation techniques.  ? Taking a warm bath or shower.  ? Listening to music.  ? Using a heating pad on the lower back for pain. If directed, apply heat to the area as often as told by your health care provider. Use the heat source that your health care provider recommends, such as a moist heat pack or a heating pad.  ? Place a towel between your skin and the heat source.  ? Leave the heat on for 20-30 minutes.  ? Remove the heat if your skin turns bright red. This is especially important if you are unable to feel pain, heat, or cold. You have a greater risk of getting burned.  Contact a health care provider if:  • Your labor has started.  • Your water breaks.  • You have nausea, vomiting, or diarrhea.  Get help right away if:  • You have painful, regular contractions that are  5 minutes apart or less.  • Labor starts before you are 37 weeks along in your pregnancy.  • You have a fever.  • You have bright red blood coming from your vagina.  • You do not feel your baby moving.  • You have a severe headache with or without vision problems.  • You have chest pain or shortness of breath.  These symptoms may represent a serious problem that is an emergency. Do not wait to see if the symptoms will go away. Get medical help right away. Call your local emergency services (911 in the [place] Do not drive yourself to the hospital.  Summary  • Labor is your body's natural process of moving your baby and the placenta out of your uterus.  • The process of labor usually starts when your baby is full-term, between 39 and 40 weeks of pregnancy.  • When labor starts, or if your water breaks, call your health care provider or nurse care line. Based on your situation, they will determine when you should go in for an exam.  This information is not intended to replace advice given to you by your health care provider. Make sure you discuss any questions you have with your health care provider.  Document Revised: 05/03/2022 Document Reviewed: 05/03/2022  [employer] Patient Education © 2022 [employer]       01/25/2023 18:20:41    Form - Fetal Movement Counts    Fetal Movement Counts  Patient Name: ________________________________________________ Patient Due Date: ____________________  What is a fetal movement count?  A fetal movement count is the number of times that you feel your baby move during a certain amount of time. This may also be called a fetal kick count. A fetal movement count is recommended for every pregnant woman. You may be asked to start counting fetal movements as early as week 28 of your pregnancy.  Pay attention to when your baby is most active. You may notice your baby's sleep and wake cycles. You may also notice things that make your baby move more. You should do a fetal movement count:  •  When your baby is normally most active.  • At the same time each day.  A good time to count movements is while you are resting, after having something to eat and drink.  How do I count fetal movements?  1. Find a quiet, comfortable area. Sit, or lie down on your side.  2. Write down the date, the start time and stop time, and the number of movements that you felt between those two times. Take this information with you to your health care visits.  3. Write down your start time when you feel the first movement.  4. Count kicks, flutters, swishes, rolls, and jabs. You should feel at least 10 movements.  5. You may stop counting after you have felt 10 movements, or if you have been counting for 2 hours. Write down the stop time.  6. If you do not feel 10 movements in 2 hours, contact your health care provider for further instructions. Your health care provider may want to do additional tests to assess your baby's well-being.  Contact a health care provider if:  • You feel fewer than 10 movements in 2 hours.  • Your baby is not moving like he or she usually does.  Date: ____________ Start time: ____________ Stop time: ____________ Movements: ____________  Date: ____________ Start time: ____________ Stop time: ____________ Movements: ____________  Date: ____________ Start time: ____________ Stop time: ____________ Movements: ____________  Date: ____________ Start time: ____________ Stop time: ____________ Movements: ____________  Date: ____________ Start time: ____________ Stop time: ____________ Movements: ____________  Date: ____________ Start time: ____________ Stop time: ____________ Movements: ____________  Date: ____________ Start time: ____________ Stop time: ____________ Movements: ____________  Date: ____________ Start time: ____________ Stop time: ____________ Movements: ____________  Date: ____________ Start time: ____________ Stop time: ____________ Movements: ____________  This information is not intended to  replace advice given to you by your health care provider. Make sure you discuss any questions you have with your health care provider.  Document Revised: 2020 Document Reviewed: 2020  [employer] Patient Education 2022 [employer]       Follow Up Care    2023 16:50:39    With: NONE NONE  Address: Unknown  When:   Unknown    Goals    No data available for this section    Reason for Referral    No data available for this section    Hospital Course    No data available for this section    Obstetrics Progress note    HERNANDEZ ASIF DO: PERFORM  Event Display: Obstetrics Daily Progress Note  Authored Date: [id number]        Chief Complaint   spotting   Subjective   33 year old  with twins at 26 weeks presents with history of spotting following urination.    Review of Systems   Constitutional:  [No fatigue, no fever, no weight change.]  Head:  [No headache, no trauma.]  Eyes:   [No blurring,  no diplopia, no vision changes.]  Ears/Nose/Mouth/Throat:  [No difficulty hearing, no frequent infection, no tinnitus, no epistaxis, no postnasal drip, no gum bleeding, no sores.]      Skin:  [No pruritus, no rashes.]  Cardiac:  [No chest pain, no chest pressure, no palpitations, no dyspnea on exertion, no peripheral edema.]   Pulmonary:  [No cough, no difficulty breathing, no wheezing.]   Gastrointestinal:  [No appetite change, no nausea, no emesis, no abdominal pain, no constipation, no diarrhea, no melena, no rectal discomfort.]       Physical Exam Vitals & Measurements   T: 36.5  °C (Oral)  RR: 18  BP: 150/74  SpO2: 100%  HT: 163 cm  WT: 81.6 kg  BMI: 30.7    FHT's stable, category 1 tracing.  Neg for bleeding, UA normal   FHR Monitoring   Stable   Contraction Information   neg   Membrane Status Information   Baby A - Membrane Status: Intact (23 17:45:00)   Cervical Exam   Neg   Assessment/Plan     Pregnancy 26 weeks  Twins  normal exam  UA neg  Plan: DC to home  follow up as needed  Keep  regular office appt.   Obstetric History   : 2  Para: 0   LMP/EGA/JORGE   Gestational Age (EGA) and JORGE     * Note: EGA calculated as of 2023     JORGE: 2023   EGA*: 26 weeks 2 days            Type: Authoritative      Method Date: 2023           Method: Reported EGA/JORGE (2023)        Confirmation: Confirmed        Description: Due date        Comments: --        Entered by: KLARISSA LUCAS RN on 2023      Other JORGE Calculations for this Pregnancy:        No additional JORGE calculations have been recorded for this pregnancy   Problem List/Past Medical History   Ongoing   Pregnancy   Historical   Pregnancy   Allergies   No Known Medication Allergies   No qualifying data available   Lab Results   Lab Results    Event Name   Event Result   Date/Time   UA Color Light-Yellow 23 17:47:00  UA Appear Clear([place]) 23 17:47:00  UA Spec Grav 1.014 23 17:47:00  UA pH 7 pH Units 23 17:47:00  UA Protein Neg. 23 17:47:00  UA Glucose Trace. Abnormal 23 17:47:00  UA Ketones Neg. 23 17:47:00  UA Bili Neg. 23 17:47:00  UA Blood Neg. 23 17:47:00  UA Nitrite Neg. 23 17:47:00  UA Urobilinogen Normal 23 17:47:00  UA Leuk Est 25 U411 23 17:47:00  Wet Prep NEG 23 17:47:00        Microbiology Results   Wet Prep: NEG (23 17:47:00)     Electronically Signed on 23 06:24 PM  ________________________________________________________  HERNANDEZ ASIF DO    Discharge instructions    KLARISSA LUCAS RN: PERFORM  Event Display: Discharge Instructions  Authored Date: [id number]              Discharge Information   Your Care Team   Attending Physician -  HERNANDEZ ASIF DO   Primary Care Physician -  NONE, NONE MD   Discharge Vitals   Temperature 36.5 °C(Oral)  TMIN 36.4 °C(Oral)  TMAX 36.5 °C(Oral)  Respiratory Rate 18  Blood Pressure 150/74  Height 163 cm  Weight 81.6 kg  BMI 30.7        What to do next  Discharge To    Discharge Patient - Ordered      -- Home or Self Care, Timing: Now               Discharge Diet   No qualifying data available.    Follow Up Appointments     Follow Up with NONE NONE  When In 0 days              Medications    What When Instructions Next Dose Unchanged multivitamin, prenatal (Prenatal 19 ([place]) oral tablet, chewable)                Please share your new medication list with your primary care provider and carry a list of updated medications with you at all times in case of emergency.      Allergies    No Known Medication Allergies        Problems      Ongoing - Any problem that you are currently receiving treatment for.  Pregnancy          Historical - Any problem that you are no longer receiving treatment for.  Pregnancy          Advance Directive  Advance Directive Information    Advance Directive: No (01/25/23 17:32:00)      Education Materials        Signs and Symptoms of Labor  Labor is the body's natural process of moving the baby and the placenta out of the uterus. The process of labor usually starts when the baby is full-term, between 39 and 41 weeks of pregnancy.  Signs and symptoms that you are close to going into labor  As your body prepares for labor and the birth of your baby, you may notice the following symptoms in the weeks and days before true labor starts:    •    Passing a small amount of thick, bloody mucus from your vagina. This is called normal bloody show or losing your mucus plug. This may happen more than a week before labor begins, or right before labor begins, as the opening of the cervix starts to widen (dilate). For some women, the entire mucus plug passes at once. For others, pieces of the mucus plug may gradually pass over several days.    •    Your baby moving (dropping) lower in your pelvis to get into position for birth (lightening). When this happens, you may feel more pressure on your bladder and pelvic bone and less pressure on your ribs. This may make it  "easier to breathe. It may also cause you to need to urinate more often and have problems with bowel movements.    •    Having \"practice contractions,\" also called [place] contractions or false labor. These occur at irregular (unevenly spaced) intervals that are more than 10 minutes apart. False labor contractions are common after exercise or sexual activity. They will stop if you change position, rest, or drink fluids. These contractions are usually mild and do not get stronger over time. They may feel like:    ?    A backache or back pain.    ?    Mild cramps, similar to menstrual cramps.    ?    Tightening or pressure in your abdomen.  Other early symptoms include:    •    Nausea or loss of appetite.    •    Diarrhea.    •    Having a sudden burst of energy, or feeling very tired.    •    Mood changes.    •    Having trouble sleeping.  Signs and symptoms that labor has begun  Signs that you are in labor may include:    •    Having contractions that come at regular (evenly spaced) intervals and increase in intensity. This may feel like more intense tightening or pressure in your abdomen that moves to your back.    ?    Contractions may also feel like rhythmic pain in your upper thighs or back that comes and goes at regular intervals.    ?    If you are delivering for the first time, this change in intensity of contractions often occurs at a more gradual pace.    ?    If you have given birth before, you may notice a more rapid progression of contraction changes.    •    Feeling pressure in the vaginal area.    •    Your water breaking (rupture of membranes). This is when the sac of fluid that surrounds your baby breaks. Fluid leaking from your vagina may be clear or blood-tinged. Labor usually starts within 24 hours of your water breaking, but it may take longer to begin.    ?    Some people may feel a sudden gush of fluid; others may notice repeatedly damp underwear.  Follow these instructions at home:    •  "   When labor starts, or if your water breaks, call your health care provider or nurse care line. Based on your situation, they will determine when you should go in for an exam.    •    During early labor, you may be able to rest and manage symptoms at home. Some strategies to try at home include:    ?    Breathing and relaxation techniques.    ?    Taking a warm bath or shower.    ?    Listening to music.    ?    Using a heating pad on the lower back for pain. If directed, apply heat to the area as often as told by your health care provider. Use the heat source that your health care provider recommends, such as a moist heat pack or a heating pad.    ?    Place a towel between your skin and the heat source.    ?    Leave the heat on for 20-30 minutes.    ?    Remove the heat if your skin turns bright red. This is especially important if you are unable to feel pain, heat, or cold. You have a greater risk of getting burned.  Contact a health care provider if:    •    Your labor has started.    •    Your water breaks.    •    You have nausea, vomiting, or diarrhea.  Get help right away if:    •    You have painful, regular contractions that are 5 minutes apart or less.    •    Labor starts before you are 37 weeks along in your pregnancy.    •    You have a fever.    •    You have bright red blood coming from your vagina.    •    You do not feel your baby moving.    •    You have a severe headache with or without vision problems.    •    You have chest pain or shortness of breath.  These symptoms may represent a serious problem that is an emergency. Do not wait to see if the symptoms will go away. Get medical help right away. Call your local emergency services (911 in the [place] Do not drive yourself to the hospital.  Summary    •    Labor is your body's natural process of moving your baby and the placenta out of your uterus.    •    The process of labor usually starts when your baby is full-term, between 39 and 40 weeks  of pregnancy.    •    When labor starts, or if your water breaks, call your health care provider or nurse care line. Based on your situation, they will determine when you should go in for an exam.  This information is not intended to replace advice given to you by your health care provider. Make sure you discuss any questions you have with your health care provider.  Document Revised: 05/03/2022 Document Reviewed: 05/03/2022 Elsevier Patient Education © 2022 Elsevier Inc.          Fetal Movement Counts  Patient Name: ________________________________________________ Patient Due Date: ____________________  What is a fetal movement count?  A fetal movement count is the number of times that you feel your baby move during a certain amount of time. This may also be called a fetal kick count. A fetal movement count is recommended for every pregnant woman. You may be asked to start counting fetal movements as early as week 28 of your pregnancy.  Pay attention to when your baby is most active. You may notice your baby's sleep and wake cycles. You may also notice things that make your baby move more. You should do a fetal movement count:    •    When your baby is normally most active.    •    At the same time each day.  A good time to count movements is while you are resting, after having something to eat and drink.  How do I count fetal movements?    1.    Find a quiet, comfortable area. Sit, or lie down on your side.    2.    Write down the date, the start time and stop time, and the number of movements that you felt between those two times. Take this information with you to your health care visits.    3.    Write down your start time when you feel the first movement.    4.    Count kicks, flutters, swishes, rolls, and jabs. You should feel at least 10 movements.    5.    You may stop counting after you have felt 10 movements, or if you have been counting for 2 hours. Write down the stop time.    6.    If you do not feel 10  "movements in 2 hours, contact your health care provider for further instructions. Your health care provider may want to do additional tests to assess your baby's well-being.  Contact a health care provider if:    •    You feel fewer than 10 movements in 2 hours.    •    Your baby is not moving like he or she usually does.  Date: ____________ Start time: ____________ Stop time: ____________ Movements: ____________  Date: ____________ Start time: ____________ Stop time: ____________ Movements: ____________  Date: ____________ Start time: ____________ Stop time: ____________ Movements: ____________  Date: ____________ Start time: ____________ Stop time: ____________ Movements: ____________  Date: ____________ Start time: ____________ Stop time: ____________ Movements: ____________  Date: ____________ Start time: ____________ Stop time: ____________ Movements: ____________  Date: ____________ Start time: ____________ Stop time: ____________ Movements: ____________  Date: ____________ Start time: ____________ Stop time: ____________ Movements: ____________  Date: ____________ Start time: ____________ Stop time: ____________ Movements: ____________  This information is not intended to replace advice given to you by your health care provider. Make sure you discuss any questions you have with your health care provider.  Document Revised: 08/06/2020 Document Reviewed: 08/06/2020 Key Ring Patient Education © 2022 Key Ring Inc.             Information  IV SITE CARE   If your injection site becomes sore, reddened, or swollen, this may be due to the medication or the IV needle.  Apply moist heat to the area continuously throughout the day for 24-48 hours (clean wash cloth soaked in warm water). If no sign of improvement occurs, call your doctor.    PATIENTS ARE ADVISED TO ABSTAIN FROM THE USE OF ALL TOBACCO PRODUCTS   for more information on \"Tobacco Cessation\" call: 9-833-14F-QUIT  If you use tobacco products and have agreed to " tobacco cessation counseling:  -Please review the tobacco cessation literature that has been given to you, and  -A referral to the tobacco cessation Quit Line has been made for you.    SEDATION OR ANESTHESIA   For patients who have received sedation or anesthesia, it is typical to experience sleepiness.  For the first 24 hours:  1. Do have a responsible person with you.  2. Do not drive a car. If you are alone, do not take public transportation.  3. Do not drink alcohol.  5. Do not sign important papers or make important decisions.          [facility]   National Suicide Prevention Lifeline  988 from a cell or landline phone  988 or 1-495.921.7634 (Thai)  711 then 988 (TTY)    [facility]   Suicide & Crisis Hotlines 112-913-9431  Listed by City, [facility], or Service Area    [place], [place], [place], [place], [facility] 24 hours / 7 days  [phone]    [place], [facility] 24 hours / 7 days  [phone]    [facility]   24 hours / 7 days Helpline  [phone]    Inder Fritz, [place]ren[place], [place], [facility] Mobile [facility] 24 hours / 7 days  [phone]    [facility] [place], [place], [facility]  [phone]    [place]   Children less than 18 years  [phone]          PATIENT HEALTH RECORD (PATIENT PORTAL) ACCESS INSTRUCTIONS   Gone! Patient Portal is a secure way to access your electronic health records throughout the  [facility] system. With Gone!, you can:  •View lab results and other relevant health documents•Manage upcoming appointments•Send secure messages to your provider•View your medications      How Do I Sign Up for the Gone! Patient Portal?  If you have provided your email address to us, you may receive an email invitation to join  the [employer]  patient portal. The email will come from [email or web address] [email or web address]. Follow the instructions in  the email to access your patient record.        You may also self-enroll in the patient portal by going to [email or web address]  and providing your name and birthdate. Next, you will choose how you want to verify your account:  •Verify by email: enter the email address that you provided to [facility] and you will receive a verification code by email you must enter when self-enrolling.•Verify by personal identifiers: you will be asked to enter your Medical Record Number (MRN) and the last four digits of your Social Security Number. Your MRN can be found on your inpatient discharge instructions you received when you leave the hospital. When entering the MRN, please do not enter any letters or leading zeroes. For example, if your Medical Record Number is listed as “[id number]”or “MRN: [id number],” then only enter “[id number].”      Then, follow the instructions provided to complete the self-enrollment process. If you have any questions about  registering for the [employer] patient portal, please contact us at [phone].        To access the patient portal after you have enrolled, visit [email or web address].        For more information about the [employer] patient portal, visit [employer].        How Do I Get the [employer] Adriana?  Once you have enrolled in the [employer] patient portal, you can download the [employer] adriana  from the Apple Adriana Store or Google Play Store.        How do I Connect My Health Information with other Health Management Apps?  [facility] also offers the ability to securely connect your [employer] patient portal information  with some of the health management apps you may use (i.e. fitness trackers, dietary trackers, etc.) Please  visit  [employer] to learn more about this opportunity.      Signature Page for Patient Discharge Instructions   Patient Name: VIKAS KUHN         I have received this information and my questions have been answered.   2023          Patient/Representative Signature:____________________________________ Time:____________________________________        Relationship to Patient:___________________________________________        Witness Signature:________________________________________________ Time:____________________________________         Assessment and Plan:  Problem List Items Addressed This Visit        Gravid and     Twin pregnancy, dichorionic/diamniotic, second trimester  Labs today as noted.  Scan for growth next visit.    Relevant Orders    Gestational Screen 1 Hr ([facility])    CBC & Differential    US Ob Follow Up Transabdominal Approach   Other Visit Diagnoses     Encounter for supervision of high risk pregnancy in second trimester, antepartum    -  Primary  Topics discussed:     GERD management  kick counts and fetal movement  PIH precautions   labor signs and symptoms  CBC and Glucola today.  Scan for growth next visit.  Note is given for work to limit to 4 hours/day.    Relevant Orders    Gestational Screen 1 Hr ([facility])    CBC & Differential    US Ob Follow Up Transabdominal Approach    Vaginal bleeding      Patient with vaginal bleeding as noted.  Scan today is obtained.  Patient has been reassured regarding those findings.  Recommend pelvic rest.  Modified bedrest over the weekend.  Note is given for work.  Patient is to follow-up as discussed.    Relevant Orders    US Ob Transvaginal (Completed)    Bilateral leg edema      Recommend elevation of the feet.  Patient is to limit her sodium intake as well.  Patient is to increase p.o. fluids.  PIH precautions and instructions have been given in        Follow Up/Instructions:  Follow up as scheduled.  Patient was given  instructions and counseling regarding her condition or for health maintenance advice. Please see specific information pulled into the AVS if appropriate.     Note: Speech recognition transcription software may have been used to dictate portions of this document.  An attempt at proofreading has been made though minor errors in transcription may still be present.    This note was electronically signed.  Ida Hall M.D.

## 2023-01-28 LAB
BASOPHILS # BLD AUTO: 0.06 10*3/MM3 (ref 0–0.2)
BASOPHILS NFR BLD AUTO: 0.4 % (ref 0–1.5)
EOSINOPHIL # BLD AUTO: 0.11 10*3/MM3 (ref 0–0.4)
EOSINOPHIL NFR BLD AUTO: 0.7 % (ref 0.3–6.2)
ERYTHROCYTE [DISTWIDTH] IN BLOOD BY AUTOMATED COUNT: 12.6 % (ref 12.3–15.4)
GLUCOSE 1H P 50 G GLC PO SERPL-MCNC: 120 MG/DL (ref 65–139)
HCT VFR BLD AUTO: 29.7 % (ref 34–46.6)
HGB BLD-MCNC: 9.5 G/DL (ref 12–15.9)
IMM GRANULOCYTES # BLD AUTO: 0.55 10*3/MM3 (ref 0–0.05)
IMM GRANULOCYTES NFR BLD AUTO: 3.3 % (ref 0–0.5)
LYMPHOCYTES # BLD AUTO: 2.23 10*3/MM3 (ref 0.7–3.1)
LYMPHOCYTES NFR BLD AUTO: 13.4 % (ref 19.6–45.3)
MCH RBC QN AUTO: 27.7 PG (ref 26.6–33)
MCHC RBC AUTO-ENTMCNC: 32 G/DL (ref 31.5–35.7)
MCV RBC AUTO: 86.6 FL (ref 79–97)
MONOCYTES # BLD AUTO: 0.65 10*3/MM3 (ref 0.1–0.9)
MONOCYTES NFR BLD AUTO: 3.9 % (ref 5–12)
NEUTROPHILS # BLD AUTO: 13.07 10*3/MM3 (ref 1.7–7)
NEUTROPHILS NFR BLD AUTO: 78.3 % (ref 42.7–76)
NRBC BLD AUTO-RTO: 0 /100 WBC (ref 0–0.2)
PLATELET # BLD AUTO: 309 10*3/MM3 (ref 140–450)
RBC # BLD AUTO: 3.43 10*6/MM3 (ref 3.77–5.28)
WBC # BLD AUTO: 16.67 10*3/MM3 (ref 3.4–10.8)

## 2023-01-30 RX ORDER — FERROUS SULFATE 325(65) MG
325 TABLET ORAL 2 TIMES DAILY
Qty: 30 TABLET | Refills: 5 | Status: SHIPPED | OUTPATIENT
Start: 2023-01-30 | End: 2023-03-01

## 2023-01-31 ENCOUNTER — TELEPHONE (OUTPATIENT)
Dept: OBSTETRICS AND GYNECOLOGY | Facility: CLINIC | Age: 33
End: 2023-01-31
Payer: COMMERCIAL

## 2023-02-07 ENCOUNTER — TELEPHONE (OUTPATIENT)
Dept: OBSTETRICS AND GYNECOLOGY | Facility: CLINIC | Age: 33
End: 2023-02-07
Payer: COMMERCIAL

## 2023-02-07 RX ORDER — PANTOPRAZOLE SODIUM 20 MG/1
20 TABLET, DELAYED RELEASE ORAL DAILY
Qty: 30 TABLET | Refills: 3 | Status: SHIPPED | OUTPATIENT
Start: 2023-02-07 | End: 2023-03-29 | Stop reason: HOSPADM

## 2023-02-07 NOTE — TELEPHONE ENCOUNTER
.  Caller: Aleks Kely    Relationship: Self    Best call back number:[phone]    What is the best time to reach you: ANY AND MAY LEAVE V/M    What was the call regarding: PT STATES SHE IS ON PEPCID 20MG BID AND IT IS NOT HELPING. SHE HAS TO SLEEP SITTING UP LAST PM. INQUIRING IF THIS CAN BE INCREASE OR CHANGED TO DIFFERENT MED.     [facility] - [place], [place] - [address] - [phone] PH - [phone] FX        Do you require a callback: YES

## 2023-02-13 ENCOUNTER — HOSPITAL ENCOUNTER (OUTPATIENT)
Facility: HOSPITAL | Age: 33
Discharge: HOME | End: 2023-02-13
Attending: STUDENT IN AN ORGANIZED HEALTH CARE EDUCATION/TRAINING PROGRAM | Admitting: STUDENT IN AN ORGANIZED HEALTH CARE EDUCATION/TRAINING PROGRAM
Payer: COMMERCIAL

## 2023-02-13 ENCOUNTER — TELEPHONE (OUTPATIENT)
Dept: OBSTETRICS AND GYNECOLOGY | Facility: CLINIC | Age: 33
End: 2023-02-13

## 2023-02-13 VITALS
DIASTOLIC BLOOD PRESSURE: 56 MMHG | SYSTOLIC BLOOD PRESSURE: 118 MMHG | OXYGEN SATURATION: 98 % | TEMPERATURE: 98.6 F | HEIGHT: 64 IN | RESPIRATION RATE: 18 BRPM | WEIGHT: 198 LBS | BODY MASS INDEX: 33.8 KG/M2 | HEART RATE: 109 BPM

## 2023-02-13 LAB
BILIRUB BLD-MCNC: NEGATIVE MG/DL
CLARITY, POC: CLEAR
COLOR UR: YELLOW
GLUCOSE UR STRIP-MCNC: ABNORMAL MG/DL
KETONES UR QL: ABNORMAL
LEUKOCYTE EST, POC: NEGATIVE
NITRITE UR-MCNC: NEGATIVE MG/ML
PH UR: 6 [PH] (ref 5–8)
PROT UR STRIP-MCNC: NEGATIVE MG/DL
RBC # UR STRIP: NEGATIVE /UL
SP GR UR: 1.02 (ref 1–1.03)
UROBILINOGEN UR QL: NORMAL

## 2023-02-13 PROCEDURE — G0463 HOSPITAL OUTPT CLINIC VISIT: HCPCS

## 2023-02-13 PROCEDURE — 25010000002 ONDANSETRON PER 1 MG: Performed by: STUDENT IN AN ORGANIZED HEALTH CARE EDUCATION/TRAINING PROGRAM

## 2023-02-13 PROCEDURE — 63710000001 PROMETHAZINE PER 12.5 MG: Performed by: STUDENT IN AN ORGANIZED HEALTH CARE EDUCATION/TRAINING PROGRAM

## 2023-02-13 PROCEDURE — 81002 URINALYSIS NONAUTO W/O SCOPE: CPT | Performed by: STUDENT IN AN ORGANIZED HEALTH CARE EDUCATION/TRAINING PROGRAM

## 2023-02-13 PROCEDURE — 96374 THER/PROPH/DIAG INJ IV PUSH: CPT

## 2023-02-13 RX ORDER — SODIUM CHLORIDE 0.9 % (FLUSH) 0.9 %
10 SYRINGE (ML) INJECTION EVERY 12 HOURS SCHEDULED
Status: DISCONTINUED | OUTPATIENT
Start: 2023-02-13 | End: 2023-02-13 | Stop reason: HOSPADM

## 2023-02-13 RX ORDER — SODIUM CHLORIDE, SODIUM LACTATE, POTASSIUM CHLORIDE, CALCIUM CHLORIDE 600; 310; 30; 20 MG/100ML; MG/100ML; MG/100ML; MG/100ML
1000 INJECTION, SOLUTION INTRAVENOUS ONCE
Status: COMPLETED | OUTPATIENT
Start: 2023-02-13 | End: 2023-02-13

## 2023-02-13 RX ORDER — ONDANSETRON 4 MG/1
4 TABLET, ORALLY DISINTEGRATING ORAL EVERY 8 HOURS PRN
Qty: 30 TABLET | Refills: 3 | Status: ON HOLD | OUTPATIENT
Start: 2023-02-13 | End: 2023-03-20

## 2023-02-13 RX ORDER — PROMETHAZINE HYDROCHLORIDE 12.5 MG/1
25 TABLET ORAL ONCE
Status: COMPLETED | OUTPATIENT
Start: 2023-02-13 | End: 2023-02-13

## 2023-02-13 RX ORDER — SODIUM CHLORIDE 0.9 % (FLUSH) 0.9 %
10 SYRINGE (ML) INJECTION AS NEEDED
Status: DISCONTINUED | OUTPATIENT
Start: 2023-02-13 | End: 2023-02-13 | Stop reason: HOSPADM

## 2023-02-13 RX ORDER — ONDANSETRON 2 MG/ML
4 INJECTION INTRAMUSCULAR; INTRAVENOUS EVERY 6 HOURS PRN
Status: DISCONTINUED | OUTPATIENT
Start: 2023-02-13 | End: 2023-02-13 | Stop reason: HOSPADM

## 2023-02-13 RX ORDER — DEXTROSE, SODIUM CHLORIDE, SODIUM LACTATE, POTASSIUM CHLORIDE, AND CALCIUM CHLORIDE 5; .6; .31; .03; .02 G/100ML; G/100ML; G/100ML; G/100ML; G/100ML
1000 INJECTION, SOLUTION INTRAVENOUS ONCE
Status: COMPLETED | OUTPATIENT
Start: 2023-02-13 | End: 2023-02-13

## 2023-02-13 RX ADMIN — SODIUM CHLORIDE, SODIUM LACTATE, POTASSIUM CHLORIDE, CALCIUM CHLORIDE AND DEXTROSE MONOHYDRATE 1000 ML: 5; 600; 310; 30; 20 INJECTION, SOLUTION INTRAVENOUS at 18:19

## 2023-02-13 RX ADMIN — ONDANSETRON 4 MG: 2 INJECTION INTRAMUSCULAR; INTRAVENOUS at 18:20

## 2023-02-13 RX ADMIN — PROMETHAZINE HYDROCHLORIDE 25 MG: 12.5 TABLET ORAL at 19:11

## 2023-02-13 RX ADMIN — SODIUM CHLORIDE, POTASSIUM CHLORIDE, SODIUM LACTATE AND CALCIUM CHLORIDE 1000 ML: 600; 310; 30; 20 INJECTION, SOLUTION INTRAVENOUS at 19:11

## 2023-02-13 NOTE — TELEPHONE ENCOUNTER
Hub staff attempted to follow warm transfer process and was unsuccessful     Caller: Kely Fink    Relationship to patient: Self    Best call back number: [phone]    Patient is needing: PT SAID SHE WAS SPEAKING WITH A NURSE ABOUT A STOMACH VIRUS SHE HAS AND SHE HAS ADDITIONAL QUESTIONS.     PT CAN BE REACHED ANYTIME AND VM CAN BE LEFT IF SHE ISN'T

## 2023-02-14 NOTE — NURSING NOTE
Spoke with Dr. Joy Weeks regarding update on patient status. Patient tried eating saltine crackers and drinking water but unable to due to increased nausea after administration of zofran. See MAR for new orders of nausea medication and PIV fluids.

## 2023-02-14 NOTE — NON STRESS TEST
Triage Note - Nursing Documentation  Labor and Delivery Admission Log    Kely Fink  : 1990  MRN: [id number]  CSN: [id number]    Date in / Time in:  2023  Time in:     Date out / Time out:    Time out:     Nurse: Lynne Bolden RN    Patient Info: She is a 33 y.o. year old  at 29w0d with an JORGE of 2023, by Last Menstrual Period who was seen on the [facility] Labor Gross.    Chief Complaint:   Chief Complaint   Patient presents with   • Nausea     Been throwing up since sat and started having diarrhea today       Provider Instructions / Disposition: Prescription zophran called to home pharmacy. Patient to follow up in office at next appointment. Return to labor gross if symptoms return or get worse.    Patient Active Problem List   Diagnosis   • Pelvic pain   • Endometriosis   • History of left salpingo-oophorectomy   • Twin pregnancy, dichorionic/diamniotic, unspecified trimester       NST Documentation (Only applicable > 32 weeks):

## 2023-02-24 ENCOUNTER — ROUTINE PRENATAL (OUTPATIENT)
Dept: OBSTETRICS AND GYNECOLOGY | Facility: CLINIC | Age: 33
End: 2023-02-24
Payer: COMMERCIAL

## 2023-02-24 VITALS — DIASTOLIC BLOOD PRESSURE: 72 MMHG | BODY MASS INDEX: 35.02 KG/M2 | SYSTOLIC BLOOD PRESSURE: 118 MMHG | WEIGHT: 204 LBS

## 2023-02-24 DIAGNOSIS — O30.049 TWIN PREGNANCY, DICHORIONIC/DIAMNIOTIC, UNSPECIFIED TRIMESTER: Primary | ICD-10-CM

## 2023-02-24 PROCEDURE — 99213 OFFICE O/P EST LOW 20 MIN: CPT | Performed by: MIDWIFE

## 2023-02-24 NOTE — PROGRESS NOTES
Chief Complaint   Patient presents with   • Routine Prenatal Visit     No Complaints/concerns        HPI: Kely is a  currently at 30w4d here for prenatal visit who reports the following:  Both babies are active. She has had some mild cramping. She is working 4 hours daily as a . She has noticed an increase in swelling during work. She had a stomach bug and went to  for IVFs on . Since then, she hasn't been able to drink a lot of water. She has been seeing a chiropractor for right hip pain 3 x per week.                EXAM:     Vitals:    23 1121   BP: 118/72      Abdomen:   See prenatal flowsheet as noted and reviewed, soft, nontender   Pelvic:  See prenatal flowsheet as noted and reviewed   Urine:  See prenatal flowsheet as noted and reviewed    Lab Results   Component Value Date    ABO A 2022    RH Positive 2022    ABSCRN Negative 2022       MDM:  Impression: Twin pregnancy - dichorionic, diamniotic  Anemia in pregnancy   Tests done today: U/S for EFW - Twin A 45%, HC 2.9%, AC 70%, breech anterior placenta Twin B 27%, HC 6%, AC 36% vertex, posterior placenta   Topics discussed: kick counts and fetal movement   labor signs and symptoms   Increase water, decrease salt intake, compression socks  Reviewed OB labs   Tests next visit: none                RTO:                        2 weeks    This note was electronically signed.  Vashti Verdugo, CRISTOBAL  2023

## 2023-02-27 ENCOUNTER — HOSPITAL ENCOUNTER (OUTPATIENT)
Facility: HOSPITAL | Age: 33
Discharge: HOME | End: 2023-02-27
Attending: OBSTETRICS & GYNECOLOGY | Admitting: OBSTETRICS & GYNECOLOGY
Payer: COMMERCIAL

## 2023-02-27 ENCOUNTER — TELEPHONE (OUTPATIENT)
Dept: OBSTETRICS AND GYNECOLOGY | Facility: CLINIC | Age: 33
End: 2023-02-27
Payer: COMMERCIAL

## 2023-02-27 VITALS
WEIGHT: 203.5 LBS | DIASTOLIC BLOOD PRESSURE: 66 MMHG | HEART RATE: 104 BPM | SYSTOLIC BLOOD PRESSURE: 130 MMHG | BODY MASS INDEX: 34.93 KG/M2 | OXYGEN SATURATION: 98 % | RESPIRATION RATE: 16 BRPM | TEMPERATURE: 98.2 F

## 2023-02-27 LAB
BILIRUB BLD-MCNC: NEGATIVE MG/DL
BILIRUB UR QL STRIP: NEGATIVE
CLARITY UR: CLEAR
CLARITY, POC: CLEAR
COLOR UR: YELLOW
COLOR UR: YELLOW
GLUCOSE UR STRIP-MCNC: NEGATIVE MG/DL
GLUCOSE UR STRIP-MCNC: NEGATIVE MG/DL
HGB UR QL STRIP.AUTO: NEGATIVE
KETONES UR QL STRIP: ABNORMAL
KETONES UR QL: ABNORMAL
LEUKOCYTE EST, POC: NEGATIVE
LEUKOCYTE ESTERASE UR QL STRIP.AUTO: NEGATIVE
NITRITE UR QL STRIP: NEGATIVE
NITRITE UR-MCNC: NEGATIVE MG/ML
PH UR STRIP.AUTO: 7 [PH] (ref 5–8)
PH UR: 7 [PH] (ref 5–8)
PROT UR QL STRIP: NEGATIVE
PROT UR STRIP-MCNC: ABNORMAL MG/DL
RBC # UR STRIP: NEGATIVE /UL
SP GR UR STRIP: 1.01 (ref 1–1.03)
SP GR UR: 1.01 (ref 1–1.03)
UROBILINOGEN UR QL STRIP: ABNORMAL
UROBILINOGEN UR QL: NORMAL

## 2023-02-27 PROCEDURE — G0463 HOSPITAL OUTPT CLINIC VISIT: HCPCS

## 2023-02-27 PROCEDURE — 81002 URINALYSIS NONAUTO W/O SCOPE: CPT | Performed by: OBSTETRICS & GYNECOLOGY

## 2023-02-27 PROCEDURE — 59025 FETAL NON-STRESS TEST: CPT

## 2023-02-27 PROCEDURE — 87086 URINE CULTURE/COLONY COUNT: CPT | Performed by: OBSTETRICS & GYNECOLOGY

## 2023-02-27 PROCEDURE — 59025 FETAL NON-STRESS TEST: CPT | Performed by: OBSTETRICS & GYNECOLOGY

## 2023-02-27 PROCEDURE — 81003 URINALYSIS AUTO W/O SCOPE: CPT | Performed by: OBSTETRICS & GYNECOLOGY

## 2023-02-27 RX ORDER — NIFEDIPINE 10 MG/1
10 CAPSULE ORAL ONCE
Status: COMPLETED | OUTPATIENT
Start: 2023-02-27 | End: 2023-02-27

## 2023-02-27 RX ORDER — SODIUM CHLORIDE 0.9 % (FLUSH) 0.9 %
10 SYRINGE (ML) INJECTION AS NEEDED
Status: DISCONTINUED | OUTPATIENT
Start: 2023-02-27 | End: 2023-02-27 | Stop reason: HOSPADM

## 2023-02-27 RX ORDER — SODIUM CHLORIDE, SODIUM LACTATE, POTASSIUM CHLORIDE, CALCIUM CHLORIDE 600; 310; 30; 20 MG/100ML; MG/100ML; MG/100ML; MG/100ML
1000 INJECTION, SOLUTION INTRAVENOUS ONCE
Status: COMPLETED | OUTPATIENT
Start: 2023-02-27 | End: 2023-02-27

## 2023-02-27 RX ORDER — NIFEDIPINE 10 MG/1
10 CAPSULE ORAL ONCE
Status: DISCONTINUED | OUTPATIENT
Start: 2023-02-27 | End: 2023-02-27

## 2023-02-27 RX ORDER — SODIUM CHLORIDE 0.9 % (FLUSH) 0.9 %
10 SYRINGE (ML) INJECTION EVERY 12 HOURS SCHEDULED
Status: DISCONTINUED | OUTPATIENT
Start: 2023-02-27 | End: 2023-02-27 | Stop reason: HOSPADM

## 2023-02-27 RX ADMIN — SODIUM CHLORIDE, POTASSIUM CHLORIDE, SODIUM LACTATE AND CALCIUM CHLORIDE 1000 ML: 600; 310; 30; 20 INJECTION, SOLUTION INTRAVENOUS at 16:41

## 2023-02-27 RX ADMIN — NIFEDIPINE 10 MG: 10 CAPSULE, LIQUID FILLED ORAL at 18:05

## 2023-02-27 RX ADMIN — NIFEDIPINE 10 MG: 10 CAPSULE, LIQUID FILLED ORAL at 17:38

## 2023-02-27 NOTE — TELEPHONE ENCOUNTER
I contacted patient. Patient had to go into work and was unable to go into L&D this morning. I advised that since she is still having sharp cramps periodically that I recommend that she goes into be in put on the monitor for a while.

## 2023-02-27 NOTE — TELEPHONE ENCOUNTER
Patient called stating that she had been having abdominal cramps. Says she is having great fetal movement from both babies, also states that she is having edema. I asked if she had been drinking plenty of water, she states she had been but couldn't obtain to much due to making her vomit. I advised patient to go to L&D to be placed on the monitor.

## 2023-02-28 ENCOUNTER — TELEPHONE (OUTPATIENT)
Dept: OBSTETRICS AND GYNECOLOGY | Facility: CLINIC | Age: 33
End: 2023-02-28
Payer: COMMERCIAL

## 2023-02-28 LAB — BACTERIA SPEC AEROBE CULT: NORMAL

## 2023-02-28 RX ORDER — NIFEDIPINE 20 MG/1
20 CAPSULE ORAL EVERY 4 HOURS
Qty: 180 CAPSULE | Refills: 0 | Status: SHIPPED | OUTPATIENT
Start: 2023-02-28 | End: 2023-03-21

## 2023-02-28 NOTE — TELEPHONE ENCOUNTER
----- Message from Rahat Vazquez sent at 2/28/2023  8:56 AM EST -----  Patient was seen at [facility] last night. She was given Procardia . Patient stated [facility] informed her to call for RX from office since pharmacy was closed.

## 2023-03-01 NOTE — NON STRESS TEST
Non Stress Test     [facility]    Patient: Kely Fink  : 1990  MRN: [id number]  CSN: [id number]    Gestational Age: 31w1d    Indication for NST Irregular uterine contractions  Twin Gestation       Time On 15:48   Time Off 19:15       Interpretation    Baseline -150's beats per minute x 2   Category 1 x 2   Decelerations Absent x 2       Additional Comments See nursing notes       Recommendations for f/u See nursing notes       This note has been electronically signed.    Ida Hall M.D.

## 2023-03-02 ENCOUNTER — ROUTINE PRENATAL (OUTPATIENT)
Dept: OBSTETRICS AND GYNECOLOGY | Facility: CLINIC | Age: 33
End: 2023-03-02
Payer: COMMERCIAL

## 2023-03-02 VITALS — SYSTOLIC BLOOD PRESSURE: 146 MMHG | DIASTOLIC BLOOD PRESSURE: 78 MMHG | BODY MASS INDEX: 35.02 KG/M2 | WEIGHT: 204 LBS

## 2023-03-02 DIAGNOSIS — O09.93 ENCOUNTER FOR SUPERVISION OF HIGH RISK PREGNANCY IN THIRD TRIMESTER, ANTEPARTUM: Primary | ICD-10-CM

## 2023-03-02 DIAGNOSIS — O30.043 TWIN PREGNANCY, DICHORIONIC/DIAMNIOTIC, THIRD TRIMESTER: ICD-10-CM

## 2023-03-02 DIAGNOSIS — O47.00 PRETERM UTERINE CONTRACTIONS, ANTEPARTUM: ICD-10-CM

## 2023-03-02 DIAGNOSIS — O16.3 ELEVATED BLOOD PRESSURE AFFECTING PREGNANCY IN THIRD TRIMESTER, ANTEPARTUM: ICD-10-CM

## 2023-03-02 DIAGNOSIS — O99.019 IRON DEFICIENCY ANEMIA DURING PREGNANCY: ICD-10-CM

## 2023-03-02 DIAGNOSIS — R60.0 BILATERAL LEG EDEMA: ICD-10-CM

## 2023-03-02 DIAGNOSIS — D50.9 IRON DEFICIENCY ANEMIA DURING PREGNANCY: ICD-10-CM

## 2023-03-02 PROCEDURE — 99214 OFFICE O/P EST MOD 30 MIN: CPT | Performed by: OBSTETRICS & GYNECOLOGY

## 2023-03-02 RX ORDER — NIFEDIPINE 10 MG/1
CAPSULE ORAL
COMMUNITY
Start: 2023-02-28 | End: 2023-03-21

## 2023-03-03 NOTE — PROGRESS NOTES
Chief Complaint  Routine Prenatal Visit (No complaints. )    History of Present Illness:  Kely is a  currently at 31w4d who presents today with complaints of lower extremity edema.  Patient has been off work since her visit to labor gross.  Patient was seen on labor and delivery Monday.  Patient had worked 4 hours as a .  She was on her feet the whole time.  Patient presented to labor home was noted to have uterine contractions.  She received IV fluids as well as Procardia.  Patient has continued on her Procardia every 4 hours at home.  She reports initially having a mild headache which has improved.  She denies any visual disturbances.  Patient does have a blood pressure monitoring system at home.  She denies any vaginal bleeding or spotting.  She reports she is taking her prenatal vitamins and iron supplements.    Exam:  Vitals:  See prenatal flowsheet as noted and reviewed  General: Alert, cooperative, and does not appear in any distress  Abdomen:   See prenatal flowsheet as noted and reviewed    Uterus gravid, non-tender; no palpable masses    No guarding or rebound tenderness  Pelvic:  See prenatal flowsheet as noted and reviewed  Ext:  See prenatal flowsheet as noted and reviewed    Moves extremities well, no cyanosis and no redness  Urine:  See prenatal flowsheet as noted and reviewed    Data Review:  The following data was reviewed by: Ida Hall MD on 2023:  Prenatal Labs:  Lab Results   Component Value Date    HGB 9.5 (L) 2023    RUBELLAABIGG 1.98 2022    HEPBSAG Negative 2022    ABO A 2022    RH Positive 2022    ABSCRN Negative 2022    CIW4UVK7 Non Reactive 2022    HEPCVIRUSABY <0.1 2022    URINECX 25,000 CFU/mL Normal Urogenital Anika 2023       Admission on 2023, Discharged on 2023   Component Date Value   • Color 2023 Yellow    • Clarity, UA 2023 Clear    • Glucose, UA 2023 Negative    •  Bilirubin 02/27/2023 Negative    • Ketones, UA 02/27/2023 1+ (A)    • Specific Gravity  02/27/2023 1.010    • Blood, UA 02/27/2023 Negative    • pH, Urine 02/27/2023 7.0    • Protein, POC 02/27/2023 Trace (A)    • Urobilinogen, UA 02/27/2023 Normal    • Leukocytes 02/27/2023 Negative    • Nitrite, UA 02/27/2023 Negative    • Color, UA 02/27/2023 Yellow    • Appearance, UA 02/27/2023 Clear    • pH, UA 02/27/2023 7.0    • Specific Gravity, UA 02/27/2023 1.013    • Glucose, UA 02/27/2023 Negative    • Ketones, UA 02/27/2023 Trace (A)    • Bilirubin, UA 02/27/2023 Negative    • Blood, UA 02/27/2023 Negative    • Protein, UA 02/27/2023 Negative    • Leuk Esterase, UA 02/27/2023 Negative    • Nitrite, UA 02/27/2023 Negative    • Urobilinogen, UA 02/27/2023 0.2 E.U./dL    • Urine Culture 02/27/2023 25,000 CFU/mL Normal Urogenital Anika    Admission on 02/13/2023, Discharged on 02/13/2023   Component Date Value   • Color 02/13/2023 Yellow    • Clarity, UA 02/13/2023 Clear    • Glucose, UA 02/13/2023 250 mg/dL (A)    • Bilirubin 02/13/2023 Negative    • Ketones, UA 02/13/2023 3+ (A)    • Specific Gravity  02/13/2023 1.020    • Blood, UA 02/13/2023 Negative    • pH, Urine 02/13/2023 6.0    • Protein, POC 02/13/2023 Negative    • Urobilinogen, UA 02/13/2023 Normal    • Leukocytes 02/13/2023 Negative    • Nitrite, UA 02/13/2023 Negative      Imaging:  US Ob Follow Up Transabdominal Approach, US Ob Follow Up Transabdominal Approach  Impression:   Di/Di twin gestation at 30+4 weeks. A) Limited anatomy was reviewed today   and is normal in appearance. EFW 1639g(45%) AC 70%. Breech presentation.   Placenta is anterior. Amniotic fluid and fetal heart rate are normal. B)   Limited anatomy was reviewed today and is normal in appearance. EFW   1531g(27%) AC 36%. Cephalic presentation. Placenta is posterior. Amniotic   fluid and fetal heart rate are normal.    Brody Farah MD  Obstetrics and Gynecology  [facility]      Medical Records:  None    Assessment and Plan:  Problem List Items Addressed This Visit        Gravid and     Twin pregnancy, dichorionic/diamniotic, third trimester   Other Visit Diagnoses     Encounter for supervision of high risk pregnancy in third trimester, antepartum    -  Primary  Topics discussed:     iron supplementation  kick counts and fetal movement  PIH precautions   labor signs and symptoms  Note is given off work remainder of pregnancy.  PIH precautions and instructions are given.  Patient is to check her blood pressure 3-4 times daily at home.  She is to call for any elevated levels and is to bring her diary with her next visit.  Cervical length and scan for growth next visit as noted.    Relevant Orders    US Ob Follow Up Transabdominal Approach    US Ob Transvaginal    Bilateral leg edema      Recommend elevation of feet as noted.  Low-sodium diet.  Patient is to increase p.o. fluids.     uterine contractions, antepartum      Patient is to continue her Procardia as discussed.  Note is given off work remainder of pregnancy.  Patient is to follow-up with cervical length as discussed.    Relevant Orders    US Ob Transvaginal    Iron deficiency anemia during pregnancy      Patient is to continue her prenatal vitamins and iron supplements.    Elevated blood pressure affecting pregnancy in third trimester, antepartum      Patient is to monitor blood pressures discussed.  PIH precautions and instructions are given.  Note off work as noted.        Follow Up/Instructions:  Follow up as scheduled.  Patient was given instructions and counseling regarding her condition or for health maintenance advice. Please see specific information pulled into the AVS if appropriate.     Note: Speech recognition transcription software may have been used to dictate portions of this document.  An attempt at proofreading has been made though minor errors in transcription may still be present.    This  note was electronically signed.  Ida Hall M.D.

## 2023-03-06 ENCOUNTER — TELEPHONE (OUTPATIENT)
Dept: OBSTETRICS AND GYNECOLOGY | Facility: CLINIC | Age: 33
End: 2023-03-06
Payer: COMMERCIAL

## 2023-03-06 NOTE — TELEPHONE ENCOUNTER
Provider: DR. TURCIOS    Caller: VIKAS KUHN  Relationship to Patient: SELF  Phone Number: [phone]    Reason for Call: PT WAS SEEN ON Friday AND WAS TOLD TO TRACK HER BLOOD PRESSURE OVER THE WEEKEND CALLING WITH RESULTS    Friday AM: 129/77 Friday MID:127/79 PM: 104/57  SAT AM: 131/75 SAT MID: 123/70 PM: 129/70  SUN AM: 123/74 SUN /76 PM: 129/79     When was the patient last seen: 03/02    OK TO CALLBACK ANYTIME, IF NEEDED. OK TO LEAVE A .

## 2023-03-08 ENCOUNTER — ROUTINE PRENATAL (OUTPATIENT)
Dept: OBSTETRICS AND GYNECOLOGY | Facility: CLINIC | Age: 33
End: 2023-03-08
Payer: COMMERCIAL

## 2023-03-08 VITALS — SYSTOLIC BLOOD PRESSURE: 132 MMHG | DIASTOLIC BLOOD PRESSURE: 82 MMHG | WEIGHT: 204.6 LBS | BODY MASS INDEX: 35.12 KG/M2

## 2023-03-08 DIAGNOSIS — O30.049 TWIN PREGNANCY, DICHORIONIC/DIAMNIOTIC, UNSPECIFIED TRIMESTER: ICD-10-CM

## 2023-03-08 DIAGNOSIS — O09.93 ENCOUNTER FOR SUPERVISION OF HIGH RISK PREGNANCY IN THIRD TRIMESTER, ANTEPARTUM: Primary | ICD-10-CM

## 2023-03-08 DIAGNOSIS — O30.043 TWIN PREGNANCY, DICHORIONIC/DIAMNIOTIC, THIRD TRIMESTER: ICD-10-CM

## 2023-03-08 PROCEDURE — 99213 OFFICE O/P EST LOW 20 MIN: CPT | Performed by: OBSTETRICS & GYNECOLOGY

## 2023-03-08 RX ORDER — FERROUS SULFATE 325(65) MG
TABLET ORAL
COMMUNITY
Start: 2023-03-06 | End: 2023-03-29 | Stop reason: HOSPADM

## 2023-03-08 NOTE — PROGRESS NOTES
Chief Complaint   Patient presents with   • Routine Prenatal Visit     Prenatal visit with Cervical Length done today. No problems or concerns. TWINS        HPI:   , 32w2d gestation reports doing well    ROS:  See Prenatal Episode/Flowsheet  /82   Wt 92.8 kg (204 lb 9.6 oz)   LMP 2022   BMI 35.12 kg/m²      EXAM:  EXTREMITIES:  No swelling-See Prenatal Episode/Flowsheet    ABDOMEN:  FHTs/Movement noted-See Prenatal Episode/Flowsheet    URINE GLUCOSE/PROTEIN:  See Prenatal Episode/Flowsheet    PELVIC EXAM:  See Prenatal Episode/Flowsheet  CV:  Lungs:  GYN:    MDM:    Lab Results   Component Value Date    HGB 9.5 (L) 2023    RUBELLAABIGG 1.98 2022    HEPBSAG Negative 2022    ABO A 2022    RH Positive 2022    ABSCRN Negative 2022    CKK3ESC5 Non Reactive 2022    HEPCVIRUSABY <0.1 2022    URINECX 25,000 CFU/mL Normal Urogenital Anika 2023       U/S: Cervical length is 2 cm.  Fetus is active.  Baby A vertex baby B breech    1. IUP 32w2d  2. Routine care   3.  Dichorionic/diamniotic twin gestation   Scheduled  next week for 38 weeks  4.   contractions: Patient on Procardia 20 mg every 4.  Cervical length

## 2023-03-13 ENCOUNTER — ROUTINE PRENATAL (OUTPATIENT)
Dept: OBSTETRICS AND GYNECOLOGY | Facility: CLINIC | Age: 33
End: 2023-03-13
Payer: COMMERCIAL

## 2023-03-13 ENCOUNTER — PREP FOR SURGERY (OUTPATIENT)
Dept: OTHER | Facility: HOSPITAL | Age: 33
End: 2023-03-13
Payer: COMMERCIAL

## 2023-03-13 VITALS — BODY MASS INDEX: 34.84 KG/M2 | DIASTOLIC BLOOD PRESSURE: 82 MMHG | SYSTOLIC BLOOD PRESSURE: 144 MMHG | WEIGHT: 203 LBS

## 2023-03-13 DIAGNOSIS — D50.9 IRON DEFICIENCY ANEMIA DURING PREGNANCY: ICD-10-CM

## 2023-03-13 DIAGNOSIS — O30.043 TWIN PREGNANCY, DICHORIONIC/DIAMNIOTIC, THIRD TRIMESTER: ICD-10-CM

## 2023-03-13 DIAGNOSIS — R60.0 BILATERAL LEG EDEMA: ICD-10-CM

## 2023-03-13 DIAGNOSIS — O47.00 PRETERM UTERINE CONTRACTIONS, ANTEPARTUM: ICD-10-CM

## 2023-03-13 DIAGNOSIS — O99.019 IRON DEFICIENCY ANEMIA DURING PREGNANCY: ICD-10-CM

## 2023-03-13 DIAGNOSIS — O16.3 ELEVATED BLOOD PRESSURE AFFECTING PREGNANCY IN THIRD TRIMESTER, ANTEPARTUM: ICD-10-CM

## 2023-03-13 DIAGNOSIS — O09.93 ENCOUNTER FOR SUPERVISION OF HIGH RISK PREGNANCY IN THIRD TRIMESTER, ANTEPARTUM: Primary | ICD-10-CM

## 2023-03-13 PROCEDURE — 99214 OFFICE O/P EST MOD 30 MIN: CPT | Performed by: OBSTETRICS & GYNECOLOGY

## 2023-03-13 NOTE — PROGRESS NOTES
Chief Complaint  Routine Prenatal Visit (Growth and cervical length today, no complaints. )    History of Present Illness:  Kely is a  currently at 33w0d who presents today with no significant complaints.  Patient does still have occasional cramps and contractions.  She has been taking her Procardia every 4 hours.  She does report positive fetal movement x2.  She does report having profound fatigue and difficulty sleeping.  She is desiring to schedule primary  section.  She does report having continued lower extremity edema although improved.  Patient is on modified bedrest.  She denies any headaches at present or visual disturbances.    Exam:  Vitals:  See prenatal flowsheet as noted and reviewed  General: Alert, cooperative, and does not appear in any distress  Abdomen:   See prenatal flowsheet as noted and reviewed    Uterus gravid, non-tender; no palpable masses    No guarding or rebound tenderness  Pelvic:  See prenatal flowsheet as noted and reviewed  Ext:  See prenatal flowsheet as noted and reviewed    Moves extremities well, no cyanosis and no redness  Urine:  See prenatal flowsheet as noted and reviewed    Data Review:  The following data was reviewed by: Ida Hall MD on 2023:  Prenatal Labs:  Lab Results   Component Value Date    HGB 9.5 (L) 2023    RUBELLAABIGG 1.98 2022    HEPBSAG Negative 2022    ABO A 2022    RH Positive 2022    ABSCRN Negative 2022    [id number] Non Reactive 2022    HEPCVIRUSABY <0.1 2022    URINECX 25,000 CFU/mL Normal Urogenital Anika 2023       Admission on 2023, Discharged on 2023   Component Date Value   • Color 2023 Yellow    • Clarity, UA 2023 Clear    • Glucose, UA 2023 Negative    • Bilirubin 2023 Negative    • Ketones, UA 2023 1+ (A)    • Specific Gravity  2023 1.010    • Blood, UA 2023 Negative    • pH, Urine 2023 7.0    • Protein, POC  2023 Trace (A)    • Urobilinogen, UA 2023 Normal    • Leukocytes 2023 Negative    • Nitrite, UA 2023 Negative    • Color, UA 2023 Yellow    • Appearance, UA 2023 Clear    • pH, UA 2023 7.0    • Specific Gravity, UA 2023 1.013    • Glucose, UA 2023 Negative    • Ketones, UA 2023 Trace (A)    • Bilirubin, UA 2023 Negative    • Blood, UA 2023 Negative    • Protein, UA 2023 Negative    • Leuk Esterase, UA 2023 Negative    • Nitrite, UA 2023 Negative    • Urobilinogen, UA 2023 0.2 E.U./dL    • Urine Culture 2023 25,000 CFU/mL Normal Urogenital Anika      Imaging:  US Ob Transvaginal  Kely Fink  : 1990  MRN: [id number]  Date: 3/13/2023    Reason for exam/History:  contractions, twins    Ultrasound images are reviewed.  There is noted to be a viable   intrauterine pregnancy.   The fetal heart rate was normal x 2.   The fetus   was noted to be active x 2.   The cervical length was noted to be 2.44   cms.    The exam limitations noted:  none    See ultrasound report for measurements and structures identified.    Ida Hall MD, [facility]  OB GYN [facility] Ob Follow Up Transabdominal Approach  Kely Fink  : 1990  MRN: [id number]  Date: 3/13/2023    Reason for exam/History:  Growth    Ultrasound images are reviewed.  There is noted to be a viable   intrauterine dichorionic/diamniotic twin pregnancy. Twin A is measuring 32   weeks 4 days gestation.  The estimated fetal weight is 2151 grams at the   48.5% for growth.  The fetal heart rate was normal.   The infant was in   the vertex presentation.  The placental location was noted to be   posterior.  The MVP was 4.58 cms.  Twin B is measuring 32 weeks 4 days   gestation.  The estimated fetal weight is 2088 grams at the 39.8% for   growth.  The fetal heart rate was normal.   The infant was in the breech   presentation.  The  placental location was noted to be anterior.  The MVP   was 5.55 cms.  There was 3% discordance in growth.    The exam limitations noted:  none    See ultrasound report for measurements and structures identified.    Ida Hall MD, [facility]  OB [facility]YN [facility] Ob Follow Up Transabdominal Approach  Kely Fink  : 1990  MRN: [id number]  Date: 3/13/2023    Reason for exam/History:  Growth    Ultrasound images are reviewed.  There is noted to be a viable   intrauterine dichorionic/diamniotic twin pregnancy. Twin A is measuring 32   weeks 4 days gestation.  The estimated fetal weight is 2151 grams at the   48.5% for growth.  The fetal heart rate was normal.   The infant was in   the vertex presentation.  The placental location was noted to be   posterior.  The MVP was 4.58 cms.  Twin B is measuring 32 weeks 4 days   gestation.  The estimated fetal weight is 2088 grams at the 39.8% for   growth.  The fetal heart rate was normal.   The infant was in the breech   presentation.  The placental location was noted to be anterior.  The MVP   was 5.55 cms.  There was 3% discordance in growth.    The exam limitations noted:  none    See ultrasound report for measurements and structures identified.    Ida Hall MD, [facility]  OB GYN [place]     Medical Records:  None    Assessment and Plan:  Problem List Items Addressed This Visit    None  Visit Diagnoses     Encounter for supervision of high risk pregnancy in third trimester, antepartum    -  Primary  Topics discussed:      section risks, benefits  GERD management  iron supplementation  kick counts and fetal movement  PIH precautions   labor signs and symptoms  Labs today as noted.  Patient is to call for the results    Relevant Orders    Protein / Creatinine Ratio, Urine - Urine, Clean Catch    CBC & Differential    Comprehensive Metabolic Panel    US Fetal Biophysical Profile;Without Non-Stress Testing     Twin pregnancy, dichorionic/diamniotic, third trimester      We will start  testing twice weekly as discussed.    Relevant Orders    US Fetal Biophysical Profile;Without Non-Stress Testing    Bilateral leg edema      PIH precautions and instructions are given.  Labs today as noted.     uterine contractions, antepartum      Patient is to continue her Procardia as well as modified activities as discussed.    Elevated blood pressure affecting pregnancy in third trimester, antepartum      Labs today as noted.  Patient is to call for the results.    Relevant Orders    Protein / Creatinine Ratio, Urine - Urine, Clean Catch    CBC & Differential    Comprehensive Metabolic Panel    US Fetal Biophysical Profile;Without Non-Stress Testing    Iron deficiency anemia during pregnancy        Relevant Orders    CBC & Differential        Follow Up/Instructions:  Follow up as scheduled.  Patient was given instructions and counseling regarding her condition or for health maintenance advice. Please see specific information pulled into the AVS if appropriate.     Note: Speech recognition transcription software may have been used to dictate portions of this document.  An attempt at proofreading has been made though minor errors in transcription may still be present.    This note was electronically signed.  Ida Hall M.D.

## 2023-03-14 LAB
ALBUMIN SERPL-MCNC: 3.5 G/DL (ref 3.5–5.2)
ALBUMIN/GLOB SERPL: 1.3 G/DL
ALP SERPL-CCNC: 160 U/L (ref 39–117)
ALT SERPL-CCNC: 65 U/L (ref 1–33)
AST SERPL-CCNC: 47 U/L (ref 1–32)
BASOPHILS # BLD AUTO: 0.04 10*3/MM3 (ref 0–0.2)
BASOPHILS NFR BLD AUTO: 0.3 % (ref 0–1.5)
BILIRUB SERPL-MCNC: 0.3 MG/DL (ref 0–1.2)
BUN SERPL-MCNC: 6 MG/DL (ref 6–20)
BUN/CREAT SERPL: 9.7 (ref 7–25)
CALCIUM SERPL-MCNC: 9.2 MG/DL (ref 8.6–10.5)
CHLORIDE SERPL-SCNC: 103 MMOL/L (ref 98–107)
CO2 SERPL-SCNC: 19.6 MMOL/L (ref 22–29)
CREAT SERPL-MCNC: 0.62 MG/DL (ref 0.57–1)
EGFRCR SERPLBLD CKD-EPI 2021: 122.3 ML/MIN/1.73
EOSINOPHIL # BLD AUTO: 0.06 10*3/MM3 (ref 0–0.4)
EOSINOPHIL NFR BLD AUTO: 0.4 % (ref 0.3–6.2)
ERYTHROCYTE [DISTWIDTH] IN BLOOD BY AUTOMATED COUNT: 13.8 % (ref 12.3–15.4)
GLOBULIN SER CALC-MCNC: 2.6 GM/DL
GLUCOSE SERPL-MCNC: 117 MG/DL (ref 65–99)
HCT VFR BLD AUTO: 31.5 % (ref 34–46.6)
HGB BLD-MCNC: 10.4 G/DL (ref 12–15.9)
IMM GRANULOCYTES # BLD AUTO: 0.3 10*3/MM3 (ref 0–0.05)
IMM GRANULOCYTES NFR BLD AUTO: 2 % (ref 0–0.5)
LYMPHOCYTES # BLD AUTO: 2.21 10*3/MM3 (ref 0.7–3.1)
LYMPHOCYTES NFR BLD AUTO: 14.9 % (ref 19.6–45.3)
MCH RBC QN AUTO: 28 PG (ref 26.6–33)
MCHC RBC AUTO-ENTMCNC: 33 G/DL (ref 31.5–35.7)
MCV RBC AUTO: 84.7 FL (ref 79–97)
MONOCYTES # BLD AUTO: 0.78 10*3/MM3 (ref 0.1–0.9)
MONOCYTES NFR BLD AUTO: 5.2 % (ref 5–12)
NEUTROPHILS # BLD AUTO: 11.49 10*3/MM3 (ref 1.7–7)
NEUTROPHILS NFR BLD AUTO: 77.2 % (ref 42.7–76)
NRBC BLD AUTO-RTO: 0 /100 WBC (ref 0–0.2)
PLATELET # BLD AUTO: 303 10*3/MM3 (ref 140–450)
POTASSIUM SERPL-SCNC: 3.9 MMOL/L (ref 3.5–5.2)
PROT SERPL-MCNC: 6.1 G/DL (ref 6–8.5)
RBC # BLD AUTO: 3.72 10*6/MM3 (ref 3.77–5.28)
SODIUM SERPL-SCNC: 136 MMOL/L (ref 136–145)
WBC # BLD AUTO: 14.88 10*3/MM3 (ref 3.4–10.8)

## 2023-03-15 ENCOUNTER — HOSPITAL ENCOUNTER (OUTPATIENT)
Facility: HOSPITAL | Age: 33
Discharge: HOME | End: 2023-03-15
Attending: OBSTETRICS & GYNECOLOGY | Admitting: OBSTETRICS & GYNECOLOGY
Payer: COMMERCIAL

## 2023-03-15 VITALS
WEIGHT: 204.7 LBS | HEART RATE: 87 BPM | DIASTOLIC BLOOD PRESSURE: 73 MMHG | TEMPERATURE: 97.7 F | OXYGEN SATURATION: 98 % | RESPIRATION RATE: 18 BRPM | BODY MASS INDEX: 35.14 KG/M2 | SYSTOLIC BLOOD PRESSURE: 120 MMHG

## 2023-03-15 LAB
ALBUMIN SERPL-MCNC: 3.1 G/DL (ref 3.5–5.2)
ALBUMIN/GLOB SERPL: 1 G/DL
ALP SERPL-CCNC: 155 U/L (ref 39–117)
ALT SERPL W P-5'-P-CCNC: 62 U/L (ref 1–33)
ANION GAP SERPL CALCULATED.3IONS-SCNC: 12.9 MMOL/L (ref 5–15)
AST SERPL-CCNC: 41 U/L (ref 1–32)
BASOPHILS # BLD AUTO: 0.07 10*3/MM3 (ref 0–0.2)
BASOPHILS NFR BLD AUTO: 0.5 % (ref 0–1.5)
BILIRUB BLD-MCNC: NEGATIVE MG/DL
BILIRUB SERPL-MCNC: 0.2 MG/DL (ref 0–1.2)
BUN SERPL-MCNC: 6 MG/DL (ref 6–20)
BUN/CREAT SERPL: 9.4 (ref 7–25)
CALCIUM SPEC-SCNC: 8.6 MG/DL (ref 8.6–10.5)
CHLORIDE SERPL-SCNC: 105 MMOL/L (ref 98–107)
CLARITY, POC: CLEAR
CO2 SERPL-SCNC: 18.1 MMOL/L (ref 22–29)
COLOR UR AUTO: YELLOW
CREAT SERPL-MCNC: 0.64 MG/DL (ref 0.57–1)
CREAT UR-MCNC: 94.3 MG/DL
DEPRECATED RDW RBC AUTO: 47.3 FL (ref 37–54)
EGFRCR SERPLBLD CKD-EPI 2021: 121.3 ML/MIN/1.73
EOSINOPHIL # BLD AUTO: 0.07 10*3/MM3 (ref 0–0.4)
EOSINOPHIL NFR BLD AUTO: 0.5 % (ref 0.3–6.2)
ERYTHROCYTE [DISTWIDTH] IN BLOOD BY AUTOMATED COUNT: 15.1 % (ref 12.3–15.4)
GLOBULIN UR ELPH-MCNC: 3.2 GM/DL
GLUCOSE SERPL-MCNC: 102 MG/DL (ref 65–99)
GLUCOSE UR STRIP-MCNC: NEGATIVE MG/DL
HCT VFR BLD AUTO: 31.4 % (ref 34–46.6)
HGB BLD-MCNC: 10.1 G/DL (ref 12–15.9)
IMM GRANULOCYTES # BLD AUTO: 0.25 10*3/MM3 (ref 0–0.05)
IMM GRANULOCYTES NFR BLD AUTO: 1.8 % (ref 0–0.5)
KETONES UR QL: ABNORMAL
LEUKOCYTE EST, POC: NEGATIVE
LYMPHOCYTES # BLD AUTO: 2.19 10*3/MM3 (ref 0.7–3.1)
LYMPHOCYTES NFR BLD AUTO: 15.7 % (ref 19.6–45.3)
MCH RBC QN AUTO: 27.6 PG (ref 26.6–33)
MCHC RBC AUTO-ENTMCNC: 32.2 G/DL (ref 31.5–35.7)
MCV RBC AUTO: 85.8 FL (ref 79–97)
MONOCYTES # BLD AUTO: 0.63 10*3/MM3 (ref 0.1–0.9)
MONOCYTES NFR BLD AUTO: 4.5 % (ref 5–12)
NEUTROPHILS NFR BLD AUTO: 10.74 10*3/MM3 (ref 1.7–7)
NEUTROPHILS NFR BLD AUTO: 77 % (ref 42.7–76)
NITRITE UR-MCNC: NEGATIVE MG/ML
NRBC BLD AUTO-RTO: 0 /100 WBC (ref 0–0.2)
PH UR: 7 [PH] (ref 5–8)
PLATELET # BLD AUTO: 304 10*3/MM3 (ref 140–450)
PMV BLD AUTO: 11.6 FL (ref 6–12)
POTASSIUM SERPL-SCNC: 4 MMOL/L (ref 3.5–5.2)
PROT ?TM UR-MCNC: 12 MG/DL
PROT SERPL-MCNC: 6.3 G/DL (ref 6–8.5)
PROT UR STRIP-MCNC: ABNORMAL MG/DL
PROT/CREAT UR: 127.3 MG/G CREA (ref 0–200)
RBC # BLD AUTO: 3.66 10*6/MM3 (ref 3.77–5.28)
RBC # UR STRIP: NEGATIVE /UL
SODIUM SERPL-SCNC: 136 MMOL/L (ref 136–145)
SP GR UR: 1.01 (ref 1–1.03)
UROBILINOGEN UR QL: NORMAL
WBC NRBC COR # BLD: 13.95 10*3/MM3 (ref 3.4–10.8)

## 2023-03-15 PROCEDURE — 85025 COMPLETE CBC W/AUTO DIFF WBC: CPT | Performed by: OBSTETRICS & GYNECOLOGY

## 2023-03-15 PROCEDURE — 36415 COLL VENOUS BLD VENIPUNCTURE: CPT | Performed by: OBSTETRICS & GYNECOLOGY

## 2023-03-15 PROCEDURE — 82570 ASSAY OF URINE CREATININE: CPT | Performed by: OBSTETRICS & GYNECOLOGY

## 2023-03-15 PROCEDURE — G0463 HOSPITAL OUTPT CLINIC VISIT: HCPCS

## 2023-03-15 PROCEDURE — 80053 COMPREHEN METABOLIC PANEL: CPT | Performed by: OBSTETRICS & GYNECOLOGY

## 2023-03-15 PROCEDURE — 59025 FETAL NON-STRESS TEST: CPT

## 2023-03-15 PROCEDURE — 84156 ASSAY OF PROTEIN URINE: CPT | Performed by: OBSTETRICS & GYNECOLOGY

## 2023-03-15 PROCEDURE — 81002 URINALYSIS NONAUTO W/O SCOPE: CPT | Performed by: OBSTETRICS & GYNECOLOGY

## 2023-03-15 PROCEDURE — 59025 FETAL NON-STRESS TEST: CPT | Performed by: OBSTETRICS & GYNECOLOGY

## 2023-03-15 NOTE — NON STRESS TEST
Triage Note - Nursing Documentation  Labor and Delivery Admission Log    Kely Fink  : 1990  MRN: [id number]  CSN: [id number]    Date in / Time in:  3/15/2023  Time in: 1419    Date out / Time out:  3/15/2023  Time out: 1630    Nurse: Lynne Stark RN    Patient Info: She is a 33 y.o. year old  at 33w2d with an JORGE of 2023, by Last Menstrual Period who was seen on the [facility].    Chief Complaint: No chief complaint on file.      Provider Instructions / Disposition: Patient arrived for scheduled NST for twins. Patient placed on monitors. Spoke with Dr. Brody Farah regarding urine lab that needed to collected from previous office visit. Dr. Farah wanted lab work drawn and sent for possible preeclampsia. Also, he wanted patient to come back Saturday 3/18/2023, for NST and to stop taking the procardia that was prescribed to review her BP on her next NST. Dr. Farah also wanted to educate patient on possibly getting steroids in case it is needed. Patient given education regarding plan of care and patient verbalized understanding and went home with written education. Dr. Farah aware of situation and agreeable for discharge.    Patient Active Problem List   Diagnosis   • Pelvic pain   • Endometriosis   • History of left salpingo-oophorectomy   • Twin pregnancy, dichorionic/diamniotic, unspecified trimester       NST Documentation (Only applicable > 32 weeks): Interpretation A  Nonstress Test Interpretation A: Reactive (03/15/23 1600 : Lynne Stark, RN)  Interpretation B  Nonstress Test Interpretation B: Reactive (03/15/23 1600 : Lynne Stark, RN)

## 2023-03-18 ENCOUNTER — HOSPITAL ENCOUNTER (OUTPATIENT)
Facility: HOSPITAL | Age: 33
Discharge: HOME | End: 2023-03-18
Attending: OBSTETRICS & GYNECOLOGY | Admitting: OBSTETRICS & GYNECOLOGY
Payer: COMMERCIAL

## 2023-03-18 VITALS
HEART RATE: 86 BPM | DIASTOLIC BLOOD PRESSURE: 74 MMHG | OXYGEN SATURATION: 97 % | TEMPERATURE: 98.3 F | SYSTOLIC BLOOD PRESSURE: 121 MMHG

## 2023-03-18 LAB
BILIRUB BLD-MCNC: NEGATIVE MG/DL
CLARITY, POC: CLEAR
COLOR UR: ABNORMAL
GLUCOSE UR STRIP-MCNC: NEGATIVE MG/DL
KETONES UR QL: ABNORMAL
LEUKOCYTE EST, POC: NEGATIVE
NITRITE UR-MCNC: NEGATIVE MG/ML
PH UR: 6.5 [PH] (ref 5–8)
PROT UR STRIP-MCNC: ABNORMAL MG/DL
RBC # UR STRIP: NEGATIVE /UL
SP GR UR: 1.02 (ref 1–1.03)
UROBILINOGEN UR QL: NORMAL

## 2023-03-18 PROCEDURE — G0463 HOSPITAL OUTPT CLINIC VISIT: HCPCS

## 2023-03-18 PROCEDURE — 59025 FETAL NON-STRESS TEST: CPT

## 2023-03-18 PROCEDURE — 81002 URINALYSIS NONAUTO W/O SCOPE: CPT | Performed by: OBSTETRICS & GYNECOLOGY

## 2023-03-18 PROCEDURE — 59025 FETAL NON-STRESS TEST: CPT | Performed by: OBSTETRICS & GYNECOLOGY

## 2023-03-18 NOTE — NON STRESS TEST
Triage Note - Nursing Documentation  Labor and Delivery Admission Log    Kely Fink  : 1990  MRN: [id number]  CSN: [id number]    Date in / Time in:  3/18/2023  Time in: 1012    Date out / Time out:    Time out: 1054    Nurse: Scarlett Paz RN    Patient Info: She is a 33 y.o. year old  at 33w5d with an JORGE of 2023, by Last Menstrual Period who was seen on the [facility] Labor Cai.    Chief Complaint:   Chief Complaint   Patient presents with   • Non-stress Test     Twins       Provider Instructions / Disposition:     Take home instructions of  labor and fetal movement reviewed with patient.  Patient verbalized understanding to instructions and signed.  Patient has follow-up appointment on 3/20/23 in OB/GYN office.  Patient instructed if any change in condition; patient to return to Labor Cai for assessment/evaluation.  Patient discharged home in stable condition. - Scarlett Paz RN.    Patient Active Problem List   Diagnosis   • Pelvic pain   • Endometriosis   • History of left salpingo-oophorectomy   • Twin pregnancy, dichorionic/diamniotic, unspecified trimester       NST Documentation (Only applicable > 32 weeks): Interpretation A  Nonstress Test Interpretation A: Reactive (23 1054 : Scarlett Paz RN)

## 2023-03-19 ENCOUNTER — HOSPITAL ENCOUNTER (OUTPATIENT)
Facility: HOSPITAL | Age: 33
Discharge: HOME | End: 2023-03-19
Attending: OBSTETRICS & GYNECOLOGY | Admitting: OBSTETRICS & GYNECOLOGY
Payer: COMMERCIAL

## 2023-03-19 VITALS
DIASTOLIC BLOOD PRESSURE: 71 MMHG | OXYGEN SATURATION: 97 % | SYSTOLIC BLOOD PRESSURE: 125 MMHG | WEIGHT: 206.3 LBS | HEIGHT: 64 IN | RESPIRATION RATE: 18 BRPM | HEART RATE: 85 BPM | BODY MASS INDEX: 35.22 KG/M2

## 2023-03-19 LAB
BILIRUB BLD-MCNC: NEGATIVE MG/DL
CLARITY, POC: CLEAR
COLOR UR: NORMAL
GLUCOSE UR STRIP-MCNC: NEGATIVE MG/DL
KETONES UR QL: NEGATIVE
LEUKOCYTE EST, POC: NEGATIVE
NITRITE UR-MCNC: NEGATIVE MG/ML
PH UR: 7 [PH] (ref 5–8)
PROT UR STRIP-MCNC: NEGATIVE MG/DL
RBC # UR STRIP: NEGATIVE /UL
SP GR UR: 1.01 (ref 1–1.03)
UROBILINOGEN UR QL: NORMAL

## 2023-03-19 PROCEDURE — 59025 FETAL NON-STRESS TEST: CPT

## 2023-03-19 PROCEDURE — 81002 URINALYSIS NONAUTO W/O SCOPE: CPT | Performed by: OBSTETRICS & GYNECOLOGY

## 2023-03-19 PROCEDURE — G0463 HOSPITAL OUTPT CLINIC VISIT: HCPCS

## 2023-03-19 RX ORDER — NIFEDIPINE 10 MG/1
20 CAPSULE ORAL EVERY 8 HOURS SCHEDULED
Status: DISCONTINUED | OUTPATIENT
Start: 2023-03-19 | End: 2023-03-19

## 2023-03-19 RX ORDER — SODIUM CHLORIDE 0.9 % (FLUSH) 0.9 %
10 SYRINGE (ML) INJECTION AS NEEDED
Status: DISCONTINUED | OUTPATIENT
Start: 2023-03-19 | End: 2023-03-19 | Stop reason: HOSPADM

## 2023-03-19 RX ORDER — SODIUM CHLORIDE 0.9 % (FLUSH) 0.9 %
10 SYRINGE (ML) INJECTION EVERY 12 HOURS SCHEDULED
Status: DISCONTINUED | OUTPATIENT
Start: 2023-03-19 | End: 2023-03-19 | Stop reason: HOSPADM

## 2023-03-19 RX ADMIN — NIFEDIPINE 20 MG: 10 CAPSULE, LIQUID FILLED ORAL at 18:30

## 2023-03-19 RX ADMIN — SODIUM CHLORIDE 1000 ML: 9 INJECTION, SOLUTION INTRAVENOUS at 18:30

## 2023-03-19 NOTE — NON STRESS TEST
Non Stress Test    [facility]    Patient: Kely Fink  : 1990  MRN: [id number]  CSN: [id number]    Gestational Age: 33w5d    Indication for NST multiple gestation and gestational htn       Time On 10:20   Time Off 10:48       Interpretation    Baseline -130's beats per minute x 2   Category 1   Decelerations Absent       Additional Comments See nursing notes       Recommendations for f/u See nursing notes       This note has been electronically signed.    Ida Hall M.D.

## 2023-03-20 ENCOUNTER — HOSPITAL ENCOUNTER (OUTPATIENT)
Facility: HOSPITAL | Age: 33
Discharge: HOME | End: 2023-03-20
Attending: OBSTETRICS & GYNECOLOGY | Admitting: OBSTETRICS & GYNECOLOGY
Payer: COMMERCIAL

## 2023-03-20 ENCOUNTER — ROUTINE PRENATAL (OUTPATIENT)
Dept: OBSTETRICS AND GYNECOLOGY | Facility: CLINIC | Age: 33
End: 2023-03-20
Payer: COMMERCIAL

## 2023-03-20 VITALS — DIASTOLIC BLOOD PRESSURE: 84 MMHG | BODY MASS INDEX: 35.7 KG/M2 | WEIGHT: 208 LBS | SYSTOLIC BLOOD PRESSURE: 126 MMHG

## 2023-03-20 VITALS
SYSTOLIC BLOOD PRESSURE: 123 MMHG | RESPIRATION RATE: 18 BRPM | HEIGHT: 64 IN | HEART RATE: 78 BPM | WEIGHT: 207.1 LBS | DIASTOLIC BLOOD PRESSURE: 68 MMHG | OXYGEN SATURATION: 97 % | BODY MASS INDEX: 35.36 KG/M2

## 2023-03-20 DIAGNOSIS — O47.00 PRETERM UTERINE CONTRACTIONS, ANTEPARTUM: ICD-10-CM

## 2023-03-20 DIAGNOSIS — O09.93 ENCOUNTER FOR SUPERVISION OF HIGH RISK PREGNANCY IN THIRD TRIMESTER, ANTEPARTUM: Primary | ICD-10-CM

## 2023-03-20 DIAGNOSIS — O99.019 MATERNAL ANEMIA IN PREGNANCY, ANTEPARTUM: ICD-10-CM

## 2023-03-20 DIAGNOSIS — O30.043 DICHORIONIC DIAMNIOTIC TWIN PREGNANCY IN THIRD TRIMESTER: ICD-10-CM

## 2023-03-20 DIAGNOSIS — O13.3 GESTATIONAL HYPERTENSION WITHOUT SIGNIFICANT PROTEINURIA IN THIRD TRIMESTER: ICD-10-CM

## 2023-03-20 LAB
ALBUMIN SERPL-MCNC: 3.3 G/DL (ref 3.5–5.2)
ALBUMIN/GLOB SERPL: 1 G/DL
ALP SERPL-CCNC: 165 U/L (ref 39–117)
ALT SERPL W P-5'-P-CCNC: 47 U/L (ref 1–33)
ANION GAP SERPL CALCULATED.3IONS-SCNC: 12.7 MMOL/L (ref 5–15)
AST SERPL-CCNC: 32 U/L (ref 1–32)
BASOPHILS # BLD AUTO: 0.06 10*3/MM3 (ref 0–0.2)
BASOPHILS NFR BLD AUTO: 0.5 % (ref 0–1.5)
BILIRUB SERPL-MCNC: 0.3 MG/DL (ref 0–1.2)
BILIRUB UR QL STRIP: NEGATIVE
BUN SERPL-MCNC: 5 MG/DL (ref 6–20)
BUN/CREAT SERPL: 8.5 (ref 7–25)
CALCIUM SPEC-SCNC: 8.7 MG/DL (ref 8.6–10.5)
CHLORIDE SERPL-SCNC: 102 MMOL/L (ref 98–107)
CLARITY UR: CLEAR
CO2 SERPL-SCNC: 20.3 MMOL/L (ref 22–29)
COLOR UR: YELLOW
CREAT SERPL-MCNC: 0.59 MG/DL (ref 0.57–1)
CREAT UR-MCNC: 119.9 MG/DL
DEPRECATED RDW RBC AUTO: 48 FL (ref 37–54)
EGFRCR SERPLBLD CKD-EPI 2021: 123.7 ML/MIN/1.73
EOSINOPHIL # BLD AUTO: 0.05 10*3/MM3 (ref 0–0.4)
EOSINOPHIL NFR BLD AUTO: 0.4 % (ref 0.3–6.2)
ERYTHROCYTE [DISTWIDTH] IN BLOOD BY AUTOMATED COUNT: 15.6 % (ref 12.3–15.4)
GLOBULIN UR ELPH-MCNC: 3.2 GM/DL
GLUCOSE SERPL-MCNC: 79 MG/DL (ref 65–99)
GLUCOSE UR STRIP-MCNC: NEGATIVE MG/DL
HCT VFR BLD AUTO: 31.3 % (ref 34–46.6)
HGB BLD-MCNC: 10.2 G/DL (ref 12–15.9)
HGB UR QL STRIP.AUTO: NEGATIVE
IMM GRANULOCYTES # BLD AUTO: 0.27 10*3/MM3 (ref 0–0.05)
IMM GRANULOCYTES NFR BLD AUTO: 2.2 % (ref 0–0.5)
KETONES UR QL STRIP: NEGATIVE
LEUKOCYTE ESTERASE UR QL STRIP.AUTO: NEGATIVE
LYMPHOCYTES # BLD AUTO: 2.03 10*3/MM3 (ref 0.7–3.1)
LYMPHOCYTES NFR BLD AUTO: 16.3 % (ref 19.6–45.3)
MCH RBC QN AUTO: 27.9 PG (ref 26.6–33)
MCHC RBC AUTO-ENTMCNC: 32.6 G/DL (ref 31.5–35.7)
MCV RBC AUTO: 85.5 FL (ref 79–97)
MONOCYTES # BLD AUTO: 0.65 10*3/MM3 (ref 0.1–0.9)
MONOCYTES NFR BLD AUTO: 5.2 % (ref 5–12)
NEUTROPHILS NFR BLD AUTO: 75.4 % (ref 42.7–76)
NEUTROPHILS NFR BLD AUTO: 9.36 10*3/MM3 (ref 1.7–7)
NITRITE UR QL STRIP: NEGATIVE
NRBC BLD AUTO-RTO: 0 /100 WBC (ref 0–0.2)
PH UR STRIP.AUTO: 7 [PH] (ref 5–8)
PLATELET # BLD AUTO: 288 10*3/MM3 (ref 140–450)
PMV BLD AUTO: 11.7 FL (ref 6–12)
POTASSIUM SERPL-SCNC: 4 MMOL/L (ref 3.5–5.2)
PROT ?TM UR-MCNC: 30 MG/DL
PROT SERPL-MCNC: 6.5 G/DL (ref 6–8.5)
PROT UR QL STRIP: ABNORMAL
PROT/CREAT UR: 250.2 MG/G CREA (ref 0–200)
RBC # BLD AUTO: 3.66 10*6/MM3 (ref 3.77–5.28)
SODIUM SERPL-SCNC: 135 MMOL/L (ref 136–145)
SP GR UR STRIP: 1.02 (ref 1–1.03)
UROBILINOGEN UR QL STRIP: ABNORMAL
WBC NRBC COR # BLD: 12.42 10*3/MM3 (ref 3.4–10.8)

## 2023-03-20 PROCEDURE — 96372 THER/PROPH/DIAG INJ SC/IM: CPT

## 2023-03-20 PROCEDURE — 85025 COMPLETE CBC W/AUTO DIFF WBC: CPT | Performed by: OBSTETRICS & GYNECOLOGY

## 2023-03-20 PROCEDURE — 99214 OFFICE O/P EST MOD 30 MIN: CPT | Performed by: STUDENT IN AN ORGANIZED HEALTH CARE EDUCATION/TRAINING PROGRAM

## 2023-03-20 PROCEDURE — 36415 COLL VENOUS BLD VENIPUNCTURE: CPT | Performed by: OBSTETRICS & GYNECOLOGY

## 2023-03-20 PROCEDURE — 25010000002 BETAMETHASONE ACET & SOD PHOS PER 4 MG: Performed by: OBSTETRICS & GYNECOLOGY

## 2023-03-20 PROCEDURE — 59025 FETAL NON-STRESS TEST: CPT

## 2023-03-20 PROCEDURE — 84156 ASSAY OF PROTEIN URINE: CPT | Performed by: OBSTETRICS & GYNECOLOGY

## 2023-03-20 PROCEDURE — G0463 HOSPITAL OUTPT CLINIC VISIT: HCPCS

## 2023-03-20 PROCEDURE — 80053 COMPREHEN METABOLIC PANEL: CPT | Performed by: OBSTETRICS & GYNECOLOGY

## 2023-03-20 PROCEDURE — 82570 ASSAY OF URINE CREATININE: CPT | Performed by: OBSTETRICS & GYNECOLOGY

## 2023-03-20 PROCEDURE — 81003 URINALYSIS AUTO W/O SCOPE: CPT | Performed by: OBSTETRICS & GYNECOLOGY

## 2023-03-20 PROCEDURE — 59025 FETAL NON-STRESS TEST: CPT | Performed by: OBSTETRICS & GYNECOLOGY

## 2023-03-20 RX ORDER — BETAMETHASONE SODIUM PHOSPHATE AND BETAMETHASONE ACETATE 3; 3 MG/ML; MG/ML
12 INJECTION, SUSPENSION INTRA-ARTICULAR; INTRALESIONAL; INTRAMUSCULAR; SOFT TISSUE EVERY 24 HOURS
Status: DISCONTINUED | OUTPATIENT
Start: 2023-03-20 | End: 2023-03-20 | Stop reason: HOSPADM

## 2023-03-20 RX ADMIN — BETAMETHASONE SODIUM PHOSPHATE AND BETAMETHASONE ACETATE 12 MG: 3; 3 INJECTION, SUSPENSION INTRA-ARTICULAR; INTRALESIONAL; INTRAMUSCULAR at 17:29

## 2023-03-20 NOTE — PROGRESS NOTES
Prenatal Care Visit    Subjective   Chief Complaint   Patient presents with   • Routine Prenatal Visit       History:   Kely is a  currently at 34w0d who presents for a prenatal care visit today.    Reports she is still having abdominal cramping. Denies VB, LOF. Reports some increased mucus discharge. Reports increased swelling in her feet. This had previously improved with bedrest but worsened today. She reports a headache that improved somewhat with tylenol but did not resolve completely. Active FM.     Objective   /84   Wt 94.3 kg (208 lb)   LMP 2022   BMI 35.70 kg/m²   Physical Exam:  Normal, gestational age-appropriate exam today        Assessment & Plan     1. Di/di twin pregnancy IUP @ 34w0d  2. Routine care: I have reviewed the prenatal labs and ultrasound(s) today. I have reviewed the most recent prenatal progress note(s).   3. Gestational HTN vs pre-eclampsia: Mild range blood pressures documented during two office visits (3/2/23 and 3/13/23) as well as  visit 3/18/23. Labs are notable for recent elevation in liver transaminases (47/65 AST/ALT) with normal platelets, Cr and UPC. The overall picture is somewhat unclear due to consistent procardia use for management of  contractions - Kely has been taking 20mg procardia q4 hours, totaling 120mg daily. I recommend she discontinue procardia as she remains symptomatic with  contractions (though has not demonstrated she is in  labor), and such a large dose of daily procardia could be masking progression to severe pre-eclampsia. A referral has been placed to [facility] given multiple high risk factors of this pregnancy (di/di twins, HTN,  contractions) in order to determine optimal delivery timing. Kely will present to  today for her first BMS shot with plan for second BMS shot tomorrow at the time of her NST. Pre-eclampsia precautions reviewed and will evaluate blood pressures tomorrow in the absence of  procardia.   4.  CTX: labor precautions reviewed. Reports cervix was 1cm on most recent exam. CL 2 cm at 32 wks. Discontinue procardia given above. Discussed modified activity - she is currently on bedrest and reports she remains mostly in a seated/ lying position throughout the day. Encouraged ambulation for prevention of VTE.  5. Anemia in pregnancy: continue iron supplement BID     Diagnosis Plan   1. Encounter for supervision of high risk pregnancy in third trimester, antepartum        2. Dichorionic diamniotic twin pregnancy in third trimester  [facility]      3. Gestational hypertension without significant proteinuria in third trimester  Protein / Creatinine Ratio, Urine - Urine, Clean Catch      4.  uterine contractions, antepartum        5. Maternal anemia in pregnancy, antepartum             Medication Management: Discontinue procardia. Continue PNV, iron supplement.    Topics discussed: Prenatal care milestones  GERD management  Iron supplementation  Kick counts and fetal movement  Pre-eclampsia precautions   labor signs and symptoms   Tests next visit: BPP/ NST   Next visit: 1 week(s)     Patient to present to  today for first BMS shot. Referral placed to [facility] for optimal delivery timing recommendations given twins, hypertensive disorder of pregnancy.    Joy Weeks MD  Obstetrics and Gynecology  [facility]

## 2023-03-20 NOTE — NON STRESS TEST
Triage Note - Nursing Documentation  Labor and Delivery Admission Log    Kely Fink  : 1990  MRN: [id number]  CSN: [id number]    Date in / Time in:  3/19/2023  Time in:     Date out / Time out:  2023  Time out:     Nurse: Daja Mittal RN    Patient Info: She is a 33 y.o. year old  at 33w6d with an JORGE of 2023, by Last Menstrual Period who was seen on the [facility] Labor Cai.    Chief Complaint:   Chief Complaint   Patient presents with   • Non-stress Test     Cramping         Provider Instructions / Disposition: Pt presented to  for cramping after stopping her Procardia after her last office visit. She was taking Procardia q4 hours to stop contractions. Pt had increasing blood pressures and was told to stop to see what her average pressures are. Pt was given 1L IV bolus  and Procardia 20mg. She verbalized she felt better and was comfortable going home. Dr. Hall OK for FL and ordered to start the Procardia q4 hours again and return to office tomorrow for her scheduled fu. Pt said she has plenty of medication left over.     Patient Active Problem List   Diagnosis   • Pelvic pain   • Endometriosis   • History of left salpingo-oophorectomy   • Twin pregnancy, dichorionic/diamniotic, unspecified trimester       NST Documentation (Only applicable > 32 weeks): Interpretation A  Nonstress Test Interpretation A: Reactive (23 2000 : Daja Mittal, RN)  Interpretation B  Nonstress Test Interpretation B: Reactive (23 : Daja Mittal, MATT)

## 2023-03-20 NOTE — NON STRESS TEST
Triage Note - Nursing Documentation  Labor and Delivery Admission Log    Kely Fink  : 1990  MRN: [id number]  CSN: [id number]    Date in / Time in:  3/20/2023  Time in:       Date out / Time out:    Time out: 1740    Nurse: Amarilys Au RN    Patient Info: She is a 33 y.o. year old  at 34w0d with an JORGE of 2023, by Last Menstrual Period who was seen on the [facility] Labor [facility].    Chief Complaint:   Chief Complaint   Patient presents with   • Non-stress Test     STEROIDS AND LABS       Provider Instructions / Disposition: Discharge home, 1st dose of celestone given, instructed on fetal kick counts, s/s of  labor and reasons to return to labor.  Follow up tomorrow on labor gross for 2nd dose of steriod. Patient verbally understood,     Patient Active Problem List   Diagnosis   • Pelvic pain   • Endometriosis   • History of left salpingo-oophorectomy   • Twin pregnancy, dichorionic/diamniotic, unspecified trimester       NST Documentation (Only applicable > 32 weeks): Interpretation A  Nonstress Test Interpretation A: Reactive (23 1645 : Amarilys Au RN)  Interpretation B  Nonstress Test Interpretation B: Reactive (23 1645 : Amarilys Au RN)

## 2023-03-21 ENCOUNTER — HOSPITAL ENCOUNTER (OUTPATIENT)
Facility: HOSPITAL | Age: 33
Discharge: HOME | End: 2023-03-21
Attending: MIDWIFE | Admitting: MIDWIFE
Payer: COMMERCIAL

## 2023-03-21 VITALS
TEMPERATURE: 98 F | RESPIRATION RATE: 16 BRPM | HEART RATE: 85 BPM | OXYGEN SATURATION: 99 % | BODY MASS INDEX: 36.08 KG/M2 | SYSTOLIC BLOOD PRESSURE: 118 MMHG | DIASTOLIC BLOOD PRESSURE: 68 MMHG | WEIGHT: 210.2 LBS

## 2023-03-21 LAB
BILIRUB BLD-MCNC: NEGATIVE MG/DL
CLARITY, POC: CLEAR
COLOR UR: YELLOW
GLUCOSE UR STRIP-MCNC: NEGATIVE MG/DL
KETONES UR QL: NEGATIVE
LEUKOCYTE EST, POC: NEGATIVE
NITRITE UR-MCNC: NEGATIVE MG/ML
PH UR: 7 [PH] (ref 5–8)
PROT UR STRIP-MCNC: ABNORMAL MG/DL
RBC # UR STRIP: NEGATIVE /UL
SP GR UR: 1.01 (ref 1–1.03)
UROBILINOGEN UR QL: NORMAL

## 2023-03-21 PROCEDURE — G0463 HOSPITAL OUTPT CLINIC VISIT: HCPCS

## 2023-03-21 PROCEDURE — 25010000002 BETAMETHASONE ACET & SOD PHOS PER 4 MG: Performed by: MIDWIFE

## 2023-03-21 PROCEDURE — 96372 THER/PROPH/DIAG INJ SC/IM: CPT

## 2023-03-21 PROCEDURE — 81002 URINALYSIS NONAUTO W/O SCOPE: CPT | Performed by: MIDWIFE

## 2023-03-21 PROCEDURE — 59025 FETAL NON-STRESS TEST: CPT

## 2023-03-21 RX ORDER — BETAMETHASONE SODIUM PHOSPHATE AND BETAMETHASONE ACETATE 3; 3 MG/ML; MG/ML
12 INJECTION, SUSPENSION INTRA-ARTICULAR; INTRALESIONAL; INTRAMUSCULAR; SOFT TISSUE EVERY 24 HOURS
Status: DISCONTINUED | OUTPATIENT
Start: 2023-03-21 | End: 2023-03-21 | Stop reason: HOSPADM

## 2023-03-21 RX ADMIN — BETAMETHASONE SODIUM PHOSPHATE AND BETAMETHASONE ACETATE 12 MG: 3; 3 INJECTION, SUSPENSION INTRA-ARTICULAR; INTRALESIONAL; INTRAMUSCULAR at 16:22

## 2023-03-21 NOTE — NON STRESS TEST
Triage Note - Nursing Documentation  Labor and Delivery Admission Log    Kely Fink  : 1990  MRN: [id number]  CSN: [id number]    Date in / Time in:  3/21/2023  Time in: 1553    Date out / Time out:  3/21/2023  Time out: 1645    Nurse: Lynne Stark RN    Patient Info: She is a 33 y.o. year old  at 34w1d with an JORGE of 2023, by Last Menstrual Period who was seen on the [facility] Labor Cai.    Chief Complaint: No chief complaint on file.      Provider Instructions / Disposition: patient arrived for scheduled NST and received her 2nd dose of celestone. Patien had reactive strip for both baby A and B. CRISTOBAL Randall called and discharge order placed after reactive strip.     Patient Active Problem List   Diagnosis   • Pelvic pain   • Endometriosis   • History of left salpingo-oophorectomy   • Twin pregnancy, dichorionic/diamniotic, unspecified trimester       NST Documentation (Only applicable > 32 weeks): Interpretation A  Nonstress Test Interpretation A: Reactive (23 1630 : Lynne Stark, RN)  Interpretation B  Nonstress Test Interpretation B: Reactive (23 1630 : Lynne Stark, RN)

## 2023-03-22 NOTE — NON STRESS TEST
Non Stress Test     [facility]    Patient: Kely Fink  : 1990  MRN: [id number]  CSN: [id number]    Gestational Age: 33w6d    Indication for NST  contractions  Twin gestation       Time On 17:24   Time Off 19:32       Interpretation    Baseline -140's x 2 beats per minute   Category 1 x 2   Decelerations Absent x 2       Additional Comments See nursing notes       Recommendations for f/u See nursing notes       This note has been electronically signed.    Ida Hall M.D.

## 2023-03-24 ENCOUNTER — OFFICE VISIT (OUTPATIENT)
Dept: OBSTETRICS AND GYNECOLOGY | Facility: HOSPITAL | Age: 33
End: 2023-03-24
Payer: COMMERCIAL

## 2023-03-24 ENCOUNTER — HOSPITAL ENCOUNTER (OUTPATIENT)
Facility: HOSPITAL | Age: 33
Discharge: HOME | End: 2023-03-24
Attending: OBSTETRICS & GYNECOLOGY | Admitting: OBSTETRICS & GYNECOLOGY
Payer: COMMERCIAL

## 2023-03-24 ENCOUNTER — HOSPITAL ENCOUNTER (OUTPATIENT)
Dept: WOMENS IMAGING | Facility: HOSPITAL | Age: 33
Discharge: HOME | End: 2023-03-24
Admitting: STUDENT IN AN ORGANIZED HEALTH CARE EDUCATION/TRAINING PROGRAM
Payer: COMMERCIAL

## 2023-03-24 VITALS
BODY MASS INDEX: 37.03 KG/M2 | HEIGHT: 63 IN | RESPIRATION RATE: 16 BRPM | SYSTOLIC BLOOD PRESSURE: 131 MMHG | DIASTOLIC BLOOD PRESSURE: 75 MMHG | HEART RATE: 76 BPM | WEIGHT: 209 LBS | TEMPERATURE: 98 F

## 2023-03-24 VITALS — SYSTOLIC BLOOD PRESSURE: 123 MMHG | DIASTOLIC BLOOD PRESSURE: 73 MMHG | WEIGHT: 209.8 LBS | BODY MASS INDEX: 36.01 KG/M2

## 2023-03-24 DIAGNOSIS — Z3A.34 34 WEEKS GESTATION OF PREGNANCY: ICD-10-CM

## 2023-03-24 DIAGNOSIS — O30.049 TWIN PREGNANCY, DICHORIONIC/DIAMNIOTIC, UNSPECIFIED TRIMESTER: Primary | ICD-10-CM

## 2023-03-24 DIAGNOSIS — R74.01 TRANSAMINITIS: ICD-10-CM

## 2023-03-24 DIAGNOSIS — R06.02 SHORTNESS OF BREATH DURING PREGNANCY: ICD-10-CM

## 2023-03-24 DIAGNOSIS — O99.891 SHORTNESS OF BREATH DURING PREGNANCY: ICD-10-CM

## 2023-03-24 LAB
ALP SERPL-CCNC: 162 U/L (ref 39–117)
ALT SERPL W P-5'-P-CCNC: 30 U/L (ref 1–33)
AST SERPL-CCNC: 25 U/L (ref 1–32)
BILIRUB SERPL-MCNC: <0.2 MG/DL (ref 0–1.2)
CREAT SERPL-MCNC: 0.6 MG/DL (ref 0.57–1)
DEPRECATED RDW RBC AUTO: 51.5 FL (ref 37–54)
ERYTHROCYTE [DISTWIDTH] IN BLOOD BY AUTOMATED COUNT: 15.9 % (ref 12.3–15.4)
HCT VFR BLD AUTO: 31.8 % (ref 34–46.6)
HGB BLD-MCNC: 9.9 G/DL (ref 12–15.9)
LDH SERPL-CCNC: 242 U/L (ref 135–214)
MCH RBC QN AUTO: 27.9 PG (ref 26.6–33)
MCHC RBC AUTO-ENTMCNC: 31.1 G/DL (ref 31.5–35.7)
MCV RBC AUTO: 89.6 FL (ref 79–97)
PLATELET # BLD AUTO: 295 10*3/MM3 (ref 140–450)
PMV BLD AUTO: 11.7 FL (ref 6–12)
RBC # BLD AUTO: 3.55 10*6/MM3 (ref 3.77–5.28)
URATE SERPL-MCNC: 4.9 MG/DL (ref 2.4–5.7)
WBC NRBC COR # BLD: 17.9 10*3/MM3 (ref 3.4–10.8)

## 2023-03-24 PROCEDURE — 85027 COMPLETE CBC AUTOMATED: CPT | Performed by: OBSTETRICS & GYNECOLOGY

## 2023-03-24 PROCEDURE — 76811 OB US DETAILED SNGL FETUS: CPT

## 2023-03-24 PROCEDURE — 59025 FETAL NON-STRESS TEST: CPT

## 2023-03-24 PROCEDURE — 76812 OB US DETAILED ADDL FETUS: CPT

## 2023-03-24 PROCEDURE — 82247 BILIRUBIN TOTAL: CPT | Performed by: OBSTETRICS & GYNECOLOGY

## 2023-03-24 PROCEDURE — 84550 ASSAY OF BLOOD/URIC ACID: CPT | Performed by: OBSTETRICS & GYNECOLOGY

## 2023-03-24 PROCEDURE — 83615 LACTATE (LD) (LDH) ENZYME: CPT | Performed by: OBSTETRICS & GYNECOLOGY

## 2023-03-24 PROCEDURE — 84460 ALANINE AMINO (ALT) (SGPT): CPT | Performed by: OBSTETRICS & GYNECOLOGY

## 2023-03-24 PROCEDURE — 84075 ASSAY ALKALINE PHOSPHATASE: CPT | Performed by: OBSTETRICS & GYNECOLOGY

## 2023-03-24 PROCEDURE — 76819 FETAL BIOPHYS PROFIL W/O NST: CPT

## 2023-03-24 PROCEDURE — 36415 COLL VENOUS BLD VENIPUNCTURE: CPT | Performed by: OBSTETRICS & GYNECOLOGY

## 2023-03-24 PROCEDURE — 82565 ASSAY OF CREATININE: CPT | Performed by: OBSTETRICS & GYNECOLOGY

## 2023-03-24 PROCEDURE — 84450 TRANSFERASE (AST) (SGOT): CPT | Performed by: OBSTETRICS & GYNECOLOGY

## 2023-03-24 PROCEDURE — G0463 HOSPITAL OUTPT CLINIC VISIT: HCPCS

## 2023-03-24 NOTE — H&P
[facility]  Obstetric History and Physical    Chief Complaint   Patient presents with   • Elevated Blood Pressure       HPI:    Patient is a 33 y.o. female  currently at 34w4d, who presents from [facility] after having a scan.  She was noted to have an elevated blood pressure reading in the office.  She also had a headache, increased swelling and SOA.   She also had a lab value from a few days ago that had a slight bump in her LFTs.   Here, her headache had improved from a 7 to a 2-3/10 after Tylenol.  She denies RUQ pain.  No vision changes.  Her breathing is improved with rest.           The following portions of the patients history were reviewed and updated as appropriate: current medications, allergies, past medical history, past surgical history, past family history, past social history and problem list .       Prenatal Information:   Maternal Prenatal Labs  Blood Type No results found for: ABO   Rh Status No results found for: RH   Antibody Screen No results found for: ABSCRN   Gonnorhea No results found for: GCCX   Chlamydia No results found for: CLAMYDCU   RPR No results found for: RPR   Syphilis Antibody No results found for: SYPHILIS   Rubella No results found for: RUBELLAIGGIN   Hepatitis B Surface Antigen No results found for: HEPBSAG   HIV-1 Antibody No results found for: LABHIV1   Hepatitis C Antibody No results found for: HEPCAB   Rapid Urin Drug Screen No results found for: AMPMETHU, BARBITSCNUR, LABBENZSCN, LABMETHSCN, LABOPIASCN, THCURSCR, COCAINEUR, AMPHETSCREEN, PROPOXSCN, BUPRENORSCNU, METAMPSCNUR, OXYCODONESCN, TRICYCLICSCN   Group B Strep Culture No results found for: GBSANTIGEN           External Prenatal Results     Pregnancy Outside Results - Transcribed From Office Records - See Scanned Records For Details     Test Value Date Time    ABO  A  22 141    Rh  Positive  22 141    Antibody Screen  Negative  22 141    Varicella IgG       Rubella  1.98 index 22 141     Hgb  9.9 g/dL 23 1005       10.2 g/dL 23 1645       10.1 g/dL 03/15/23 1504       10.4 g/dL 23 0935       9.5 g/dL 23 1318       12.4 g/dL 22 1417    Hct  31.8 % 23 1005       31.3 % 23 1645       31.4 % 03/15/23 1504       31.5 % 23 0935       29.7 % 23 1318       36.8 % 22 1417    Glucose Fasting GTT       Glucose Tolerance Test 1 hour       Glucose Tolerance Test 3 hour       Gonorrhea (discrete)       Chlamydia (discrete)       RPR  Non Reactive  22 141    VDRL       Syphilis Antibody       HBsAg  Negative  22    Herpes Simplex Virus PCR       Herpes Simplex VIrus Culture       HIV  Non Reactive  22 141    Hep C RNA Quant PCR       Hep C Antibody  <0.1 s/co ratio 22 141    AFP       Group B Strep       GBS Susceptibility to Clindamycin       GBS Susceptibility to Erythromycin       Fetal Fibronectin       Genetic Testing, Maternal Blood             Drug Screening     Test Value Date Time    Urine Drug Screen       Amphetamine Screen       Barbiturate Screen       Benzodiazepine Screen       Methadone Screen       Phencyclidine Screen       Opiates Screen       THC Screen       Cocaine Screen       Propoxyphene Screen       Buprenorphine Screen       Methamphetamine Screen       Oxycodone Screen       Tricyclic Antidepressants Screen             Legend    ^: Historical                          Past OB History:     OB History    Para Term  AB Living   2 0 0 0 1 0   SAB IAB Ectopic Molar Multiple Live Births   1 0 0 0 0 0      # Outcome Date GA Lbr Sal/2nd Weight Sex Delivery Anes PTL Lv   2 Current            1 SAB 2019              Obstetric Comments   Fob #1 - Pregnancy #1 and #2    NIPT - Neg - male/female        Past Medical History: Past Medical History:   Diagnosis Date   • Dichorionic diamniotic twin pregnancy in first trimester 2022    pt reports only having one ovary and one tube (left)    • Female infertility 2018    endometriosis and only one ovary and tube   • GERD (gastroesophageal reflux disease)    • Gestational hypertension     x 1 month / on modified bedrest/ no official diagnosis   • History of bronchitis    • History of endometriosis 2018    dx during surgery to remove tube and ovary (right)   • Missed ab 2019    x1   • Ovarian cyst 2018    grew into a tumor and needed to be removed   • Reflux esophagitis 1993   • Wears glasses 2000      Past Surgical History Past Surgical History:   Procedure Laterality Date   • D & C WITH SUCTION N/A 3/5/2019    Procedure: DILATATION AND CURETTAGE WITH SUCTION;  Surgeon: Jordan Mina MD;  Location: [facility] OR;  Service: Obstetrics/Gynecology   • DIAGNOSTIC LAPAROSCOPY N/A 10/23/2020    Procedure: DIAGNOSTIC LAPAROSCOPY, ablation of endometriosis, chromopertubation;  Surgeon: Brendon Daniels MD;  Location: [facility] OR;  Service: Obstetrics/Gynecology;  Laterality: N/A;   • ENDOSCOPY     • OOPHORECTOMY Left    • OVARIAN CYST DRAINAGE/EXCISION Left 7/24/2018    Procedure: LAPAROSCOP WITH LEFT OOPHORECTOMY with tube and cyst; extensive lysis of adhesions;  Surgeon: Jordan Mina MD;  Location: [facility] OR;  Service: Obstetrics/Gynecology   • WISDOM TOOTH EXTRACTION        Family History: Family History   Problem Relation Age of Onset   • Hypertension Mother    • Diabetes Father       Social History:  reports that she has never smoked. She has never used smokeless tobacco.   reports no history of alcohol use.   reports no history of drug use.        Review of Systems  Denies fever, CP, muscle weakness,   and rashes.  All other pertinent positives and negatives are addressed in the HPI.       Objective     Vital Signs Range for the last 24 hours  Temperature: Temp:  [98 °F (36.7 °C)] 98 °F (36.7 °C)   Temp Source: Temp src: Oral   BP: BP: (123-133)/(72-80) 131/75   Pulse: Heart Rate:  [68-79] 76   Respirations: Resp:  [16] 16   SPO2:     O2  Amount (l/min):     O2 Devices     Weight: Weight:  [94.8 kg (209 lb)-95.2 kg (209 lb 12.8 oz)] 94.8 kg (209 lb)     Physical Examination: General appearance - alert, well appearing, and in no distress and oriented to person, place, and time  Chest - clear to auscultation, no wheezes, rales or rhonchi, symmetric air entry  Heart - S1 and S2 normal, no murmurs noted  Abdomen - soft, nontender, nondistended, no masses or organomegaly  no rebound tenderness noted  bowel sounds normal  No guarding, No RUQ pain  Extremities - pedal edema 1+    Presentation: Cephalic/breech                      Fetal Heart Rate Assessment   Method: Fetal HR Assessment Method: external   Beats/min: Fetal HR (beats/min): 150   Baseline: Fetal HR Baseline: normal range   Varibility: Fetal HR Variability: moderate (amplitude range 6 to 25 bpm)   Accels: Fetal HR Accelerations: greater than/equal to 15 bpm, lasting at least 15 seconds   Decels: Fetal HR Decelerations: absent   Tracing Category:       Uterine Assessment   Method: Method: external tocotransducer   Frequency (min):     Ctx Count in 10 min:     Duration:     Intensity:     Intensity by IUPC:     Resting Tone:     Resting Tone by IUPC:     Marion Units:      NST                     Indication:  elevated blood pressure reading   Start time  : 1000                End time: 1100  15 x 15 accels x 2  Yes  No decels  Baseline  140-150s x2   Reactive NST - Yes     Laboratory Results:   Lab Results   Component Value Date     03/24/2023    HGB 9.9 (L) 03/24/2023    HCT 31.8 (L) 03/24/2023    WBC 17.90 (H) 03/24/2023     Lab Results   Component Value Date    HGB 9.9 (L) 03/24/2023     03/24/2023    AST 25 03/24/2023    ALT 30 03/24/2023     (H) 03/24/2023    URICACID 4.9 03/24/2023    BUN 5 (L) 03/20/2023    CREATININE 0.60 03/24/2023    GLUCOSE 79 03/20/2023     Lab Results   Component Value Date    SQUAMEPIUA 0-2 06/11/2018    SPECGRAVUR 1.019 03/20/2023     KETONESU Negative 03/21/2023    BLOODU Negative 03/20/2023    LEUKOCYTESUR Negative 03/21/2023    NITRITEU Negative 03/20/2023    RBCUA 31-50 (A) 06/11/2018    WBCUA 0-2 06/11/2018    BACTERIA None Seen 06/11/2018             Assessment:  1. Intrauterine pregnancy at 34w4d weeks gestation with reactive fetal status  2.  Elevated blood pressure reading  3.  Di/Di twins   4.  Desires LTCS  Plan:  1. Reassuring fetal status x2  2. Slightly elevated blood pressures, but negative work-up for current pre-eclampsia.   Will have her perform a 24 hour urine protein at home and turn into [place].    She is to keep her scheduled appointment on Monday with her primary OB.    Reviewed warnings, S/S that would warrant her to be seen over the weekend for further evaluation.   3. Given education and reassurnace   4.  All questions have been answered.  5.  D/C home.       Itz Jesus MD  3/24/2023  11:33 EDT

## 2023-03-24 NOTE — PROGRESS NOTES
Patient seen in Maternal Fetal Medicine clinic today. Please see full note in under imaging tab of patient chart in Epic (Viewpoint report).    Haydee Cardona MD

## 2023-03-24 NOTE — PROGRESS NOTES
"Pt reports in the last month her BP's have been \"all over the place\", She was placed on Procardia for  contractions on , then discontinued due to elevated liver enzymes, Pt reports Headache x 2 days with some SOA on exertion, feet and legs swollen. Pt reports having \"steriod shots\" on 3/20 and 3/21 for babies lungs. Pt reports dilated to 1cm on 3/19, Next f/u with jennifer is Monday 3/27, NIPT - wnl male/female   "

## 2023-03-26 ENCOUNTER — HOSPITAL ENCOUNTER (INPATIENT)
Facility: HOSPITAL | Age: 33
LOS: 3 days | Discharge: HOME | End: 2023-03-29
Attending: OBSTETRICS & GYNECOLOGY | Admitting: OBSTETRICS & GYNECOLOGY
Payer: COMMERCIAL

## 2023-03-26 ENCOUNTER — ANESTHESIA (OUTPATIENT)
Dept: PERIOP | Facility: HOSPITAL | Age: 33
End: 2023-03-26
Payer: COMMERCIAL

## 2023-03-26 ENCOUNTER — ANESTHESIA EVENT (OUTPATIENT)
Dept: PERIOP | Facility: HOSPITAL | Age: 33
End: 2023-03-26
Payer: COMMERCIAL

## 2023-03-26 DIAGNOSIS — O30.049 TWIN PREGNANCY, DICHORIONIC/DIAMNIOTIC, UNSPECIFIED TRIMESTER: Primary | ICD-10-CM

## 2023-03-26 PROBLEM — Z34.90 PREGNANCY: Status: ACTIVE | Noted: 2023-03-26

## 2023-03-26 PROBLEM — O26.899 HEADACHE IN PREGNANCY: Status: ACTIVE | Noted: 2023-03-26

## 2023-03-26 PROBLEM — R51.9 HEADACHE IN PREGNANCY: Status: ACTIVE | Noted: 2023-03-26

## 2023-03-26 LAB
ABO GROUP BLD: NORMAL
ALBUMIN SERPL-MCNC: 3.5 G/DL (ref 3.5–5.2)
ALBUMIN/GLOB SERPL: 1.1 G/DL
ALP SERPL-CCNC: 176 U/L (ref 39–117)
ALT SERPL W P-5'-P-CCNC: 17 U/L (ref 1–33)
ANION GAP SERPL CALCULATED.3IONS-SCNC: 13.2 MMOL/L (ref 5–15)
AST SERPL-CCNC: 18 U/L (ref 1–32)
BASOPHILS # BLD AUTO: 0.08 10*3/MM3 (ref 0–0.2)
BASOPHILS NFR BLD AUTO: 0.6 % (ref 0–1.5)
BILIRUB BLD-MCNC: NEGATIVE MG/DL
BILIRUB SERPL-MCNC: 0.2 MG/DL (ref 0–1.2)
BLD GP AB SCN SERPL QL: NEGATIVE
BUN SERPL-MCNC: 8 MG/DL (ref 6–20)
BUN/CREAT SERPL: 14.8 (ref 7–25)
CALCIUM SPEC-SCNC: 8.9 MG/DL (ref 8.6–10.5)
CHLORIDE SERPL-SCNC: 101 MMOL/L (ref 98–107)
CLARITY, POC: CLEAR
CO2 SERPL-SCNC: 21.8 MMOL/L (ref 22–29)
COLOR UR: YELLOW
CREAT SERPL-MCNC: 0.54 MG/DL (ref 0.57–1)
CREAT UR-MCNC: 65.9 MG/DL
DEPRECATED RDW RBC AUTO: 51.4 FL (ref 37–54)
EGFRCR SERPLBLD CKD-EPI 2021: 126.4 ML/MIN/1.73
EOSINOPHIL # BLD AUTO: 0.07 10*3/MM3 (ref 0–0.4)
EOSINOPHIL NFR BLD AUTO: 0.5 % (ref 0.3–6.2)
ERYTHROCYTE [DISTWIDTH] IN BLOOD BY AUTOMATED COUNT: 16.2 % (ref 12.3–15.4)
GLOBULIN UR ELPH-MCNC: 3.1 GM/DL
GLUCOSE SERPL-MCNC: 93 MG/DL (ref 65–99)
GLUCOSE UR STRIP-MCNC: NEGATIVE MG/DL
HCT VFR BLD AUTO: 32.5 % (ref 34–46.6)
HGB BLD-MCNC: 10.6 G/DL (ref 12–15.9)
IMM GRANULOCYTES # BLD AUTO: 0.35 10*3/MM3 (ref 0–0.05)
IMM GRANULOCYTES NFR BLD AUTO: 2.5 % (ref 0–0.5)
KETONES UR QL: NEGATIVE
LEUKOCYTE EST, POC: NEGATIVE
LYMPHOCYTES # BLD AUTO: 3.5 10*3/MM3 (ref 0.7–3.1)
LYMPHOCYTES NFR BLD AUTO: 24.9 % (ref 19.6–45.3)
MCH RBC QN AUTO: 28.4 PG (ref 26.6–33)
MCHC RBC AUTO-ENTMCNC: 32.6 G/DL (ref 31.5–35.7)
MCV RBC AUTO: 87.1 FL (ref 79–97)
MONOCYTES # BLD AUTO: 0.82 10*3/MM3 (ref 0.1–0.9)
MONOCYTES NFR BLD AUTO: 5.8 % (ref 5–12)
NEUTROPHILS NFR BLD AUTO: 65.7 % (ref 42.7–76)
NEUTROPHILS NFR BLD AUTO: 9.26 10*3/MM3 (ref 1.7–7)
NITRITE UR-MCNC: NEGATIVE MG/ML
NRBC BLD AUTO-RTO: 0.2 /100 WBC (ref 0–0.2)
PH UR: 6 [PH] (ref 5–8)
PLATELET # BLD AUTO: 278 10*3/MM3 (ref 140–450)
PMV BLD AUTO: 11.5 FL (ref 6–12)
POTASSIUM SERPL-SCNC: 4.1 MMOL/L (ref 3.5–5.2)
PROT ?TM UR-MCNC: 15 MG/DL
PROT SERPL-MCNC: 6.6 G/DL (ref 6–8.5)
PROT UR STRIP-MCNC: NEGATIVE MG/DL
PROT/CREAT UR: 227.6 MG/G CREA (ref 0–200)
RBC # BLD AUTO: 3.73 10*6/MM3 (ref 3.77–5.28)
RBC # UR STRIP: NEGATIVE /UL
RH BLD: POSITIVE
SARS-COV-2 RNA RESP QL NAA+PROBE: NOT DETECTED
SODIUM SERPL-SCNC: 136 MMOL/L (ref 136–145)
SP GR UR: 1.01 (ref 1–1.03)
T&S EXPIRATION DATE: NORMAL
UROBILINOGEN UR QL: NORMAL
WBC NRBC COR # BLD: 14.08 10*3/MM3 (ref 3.4–10.8)

## 2023-03-26 PROCEDURE — 36415 COLL VENOUS BLD VENIPUNCTURE: CPT | Performed by: OBSTETRICS & GYNECOLOGY

## 2023-03-26 PROCEDURE — 86850 RBC ANTIBODY SCREEN: CPT | Performed by: OBSTETRICS & GYNECOLOGY

## 2023-03-26 PROCEDURE — S0260 H&P FOR SURGERY: HCPCS | Performed by: OBSTETRICS & GYNECOLOGY

## 2023-03-26 PROCEDURE — 84112 EVAL AMNIOTIC FLUID PROTEIN: CPT | Performed by: OBSTETRICS & GYNECOLOGY

## 2023-03-26 PROCEDURE — 59515 CESAREAN DELIVERY: CPT | Performed by: OBSTETRICS & GYNECOLOGY

## 2023-03-26 PROCEDURE — 80053 COMPREHEN METABOLIC PANEL: CPT | Performed by: OBSTETRICS & GYNECOLOGY

## 2023-03-26 PROCEDURE — 86900 BLOOD TYPING SEROLOGIC ABO: CPT | Performed by: OBSTETRICS & GYNECOLOGY

## 2023-03-26 PROCEDURE — 0 CEFAZOLIN SODIUM-DEXTROSE 2-3 GM-%(50ML) RECONSTITUTED SOLUTION: Performed by: OBSTETRICS & GYNECOLOGY

## 2023-03-26 PROCEDURE — G0463 HOSPITAL OUTPT CLINIC VISIT: HCPCS

## 2023-03-26 PROCEDURE — 25010000002 PENICILLIN G POTASSIUM PER 600000 UNITS: Performed by: OBSTETRICS & GYNECOLOGY

## 2023-03-26 PROCEDURE — 25010000002 OXYTOCIN PER 10 UNITS: Performed by: NURSE ANESTHETIST, CERTIFIED REGISTERED

## 2023-03-26 PROCEDURE — 86901 BLOOD TYPING SEROLOGIC RH(D): CPT | Performed by: OBSTETRICS & GYNECOLOGY

## 2023-03-26 PROCEDURE — 63710000001 DIPHENHYDRAMINE PER 50 MG: Performed by: OBSTETRICS & GYNECOLOGY

## 2023-03-26 PROCEDURE — 25010000002 AZITHROMYCIN PER 500 MG: Performed by: OBSTETRICS & GYNECOLOGY

## 2023-03-26 PROCEDURE — 82570 ASSAY OF URINE CREATININE: CPT | Performed by: OBSTETRICS & GYNECOLOGY

## 2023-03-26 PROCEDURE — 85025 COMPLETE CBC W/AUTO DIFF WBC: CPT | Performed by: OBSTETRICS & GYNECOLOGY

## 2023-03-26 PROCEDURE — 81002 URINALYSIS NONAUTO W/O SCOPE: CPT | Performed by: OBSTETRICS & GYNECOLOGY

## 2023-03-26 PROCEDURE — 59025 FETAL NON-STRESS TEST: CPT

## 2023-03-26 PROCEDURE — 87635 SARS-COV-2 COVID-19 AMP PRB: CPT | Performed by: OBSTETRICS & GYNECOLOGY

## 2023-03-26 PROCEDURE — 25010000002 ONDANSETRON PER 1 MG: Performed by: NURSE ANESTHETIST, CERTIFIED REGISTERED

## 2023-03-26 PROCEDURE — 25010000002 MORPHINE PER 10 MG: Performed by: NURSE ANESTHETIST, CERTIFIED REGISTERED

## 2023-03-26 PROCEDURE — 63710000001 PREDNISONE PER 5 MG: Performed by: OBSTETRICS & GYNECOLOGY

## 2023-03-26 PROCEDURE — 84156 ASSAY OF PROTEIN URINE: CPT | Performed by: OBSTETRICS & GYNECOLOGY

## 2023-03-26 DEVICE — ABSORBABLE HEMOSTAT (OXIDIZED REGENERATED CELLULOSE, U.S.P.)
Type: IMPLANTABLE DEVICE | Site: ABDOMEN | Status: FUNCTIONAL
Brand: SURGICEL

## 2023-03-26 RX ORDER — METOCLOPRAMIDE 5 MG/1
10 TABLET ORAL ONCE
Status: COMPLETED | OUTPATIENT
Start: 2023-03-26 | End: 2023-03-26

## 2023-03-26 RX ORDER — PENICILLIN G 3000000 [IU]/50ML
3 INJECTION, SOLUTION INTRAVENOUS
Status: COMPLETED | OUTPATIENT
Start: 2023-03-26 | End: 2023-03-26

## 2023-03-26 RX ORDER — OXYCODONE HYDROCHLORIDE 5 MG/1
5 TABLET ORAL ONCE
Status: COMPLETED | OUTPATIENT
Start: 2023-03-26 | End: 2023-03-26

## 2023-03-26 RX ORDER — BUTALBITAL, ACETAMINOPHEN AND CAFFEINE 50; 325; 40 MG/1; MG/1; MG/1
2 TABLET ORAL EVERY 4 HOURS PRN
Status: DISCONTINUED | OUTPATIENT
Start: 2023-03-26 | End: 2023-03-26

## 2023-03-26 RX ORDER — FAMOTIDINE 10 MG/ML
20 INJECTION, SOLUTION INTRAVENOUS ONCE
Status: COMPLETED | OUTPATIENT
Start: 2023-03-26 | End: 2023-03-26

## 2023-03-26 RX ORDER — BUPIVACAINE HCL/0.9 % NACL/PF 0.125 %
PLASTIC BAG, INJECTION (ML) EPIDURAL AS NEEDED
Status: DISCONTINUED | OUTPATIENT
Start: 2023-03-26 | End: 2023-03-27 | Stop reason: SURG

## 2023-03-26 RX ORDER — MORPHINE SULFATE 1 MG/ML
INJECTION, SOLUTION EPIDURAL; INTRATHECAL; INTRAVENOUS AS NEEDED
Status: DISCONTINUED | OUTPATIENT
Start: 2023-03-26 | End: 2023-03-27 | Stop reason: SURG

## 2023-03-26 RX ORDER — DIPHENHYDRAMINE HCL 25 MG
25 CAPSULE ORAL ONCE
Status: COMPLETED | OUTPATIENT
Start: 2023-03-26 | End: 2023-03-26

## 2023-03-26 RX ORDER — SODIUM CHLORIDE, SODIUM LACTATE, POTASSIUM CHLORIDE, CALCIUM CHLORIDE 600; 310; 30; 20 MG/100ML; MG/100ML; MG/100ML; MG/100ML
125 INJECTION, SOLUTION INTRAVENOUS CONTINUOUS
Status: DISCONTINUED | OUTPATIENT
Start: 2023-03-26 | End: 2023-03-27

## 2023-03-26 RX ORDER — PREDNISONE 1 MG/1
1 TABLET ORAL ONCE
Status: COMPLETED | OUTPATIENT
Start: 2023-03-26 | End: 2023-03-26

## 2023-03-26 RX ORDER — CEFAZOLIN SODIUM 2 G/50ML
2 SOLUTION INTRAVENOUS ONCE
Status: COMPLETED | OUTPATIENT
Start: 2023-03-26 | End: 2023-03-26

## 2023-03-26 RX ORDER — PENICILLIN G 3000000 [IU]/50ML
3 INJECTION, SOLUTION INTRAVENOUS
Status: DISCONTINUED | OUTPATIENT
Start: 2023-03-27 | End: 2023-03-27 | Stop reason: HOSPADM

## 2023-03-26 RX ORDER — SODIUM CHLORIDE 0.9 % (FLUSH) 0.9 %
10 SYRINGE (ML) INJECTION EVERY 12 HOURS SCHEDULED
Status: DISCONTINUED | OUTPATIENT
Start: 2023-03-26 | End: 2023-03-27

## 2023-03-26 RX ORDER — BUPIVACAINE HYDROCHLORIDE 7.5 MG/ML
INJECTION, SOLUTION EPIDURAL; RETROBULBAR
Status: COMPLETED | OUTPATIENT
Start: 2023-03-26 | End: 2023-03-26

## 2023-03-26 RX ORDER — EPHEDRINE SULFATE 5 MG/ML
INJECTION INTRAVENOUS AS NEEDED
Status: DISCONTINUED | OUTPATIENT
Start: 2023-03-26 | End: 2023-03-27 | Stop reason: SURG

## 2023-03-26 RX ORDER — ACETAMINOPHEN 500 MG
1000 TABLET ORAL EVERY 8 HOURS PRN
Status: DISCONTINUED | OUTPATIENT
Start: 2023-03-26 | End: 2023-03-27

## 2023-03-26 RX ORDER — SODIUM CHLORIDE 9 MG/ML
25 INJECTION, SOLUTION INTRAVENOUS CONTINUOUS
Status: DISCONTINUED | OUTPATIENT
Start: 2023-03-26 | End: 2023-03-27

## 2023-03-26 RX ORDER — CEFAZOLIN SODIUM 2 G/50ML
2 SOLUTION INTRAVENOUS ONCE
Status: CANCELLED | OUTPATIENT
Start: 2023-03-26 | End: 2023-03-26

## 2023-03-26 RX ORDER — SODIUM CHLORIDE 0.9 % (FLUSH) 0.9 %
10 SYRINGE (ML) INJECTION AS NEEDED
Status: DISCONTINUED | OUTPATIENT
Start: 2023-03-26 | End: 2023-03-27

## 2023-03-26 RX ORDER — ONDANSETRON 2 MG/ML
INJECTION INTRAMUSCULAR; INTRAVENOUS AS NEEDED
Status: DISCONTINUED | OUTPATIENT
Start: 2023-03-26 | End: 2023-03-27 | Stop reason: SURG

## 2023-03-26 RX ORDER — SODIUM CHLORIDE, SODIUM LACTATE, POTASSIUM CHLORIDE, CALCIUM CHLORIDE 600; 310; 30; 20 MG/100ML; MG/100ML; MG/100ML; MG/100ML
25 INJECTION, SOLUTION INTRAVENOUS CONTINUOUS
Status: DISCONTINUED | OUTPATIENT
Start: 2023-03-26 | End: 2023-03-27

## 2023-03-26 RX ORDER — ACETAMINOPHEN 500 MG
1000 TABLET ORAL ONCE
Status: COMPLETED | OUTPATIENT
Start: 2023-03-26 | End: 2023-03-26

## 2023-03-26 RX ORDER — OXYCODONE HYDROCHLORIDE 5 MG/1
5 TABLET ORAL EVERY 8 HOURS PRN
Status: DISCONTINUED | OUTPATIENT
Start: 2023-03-26 | End: 2023-03-27

## 2023-03-26 RX ORDER — TRISODIUM CITRATE DIHYDRATE AND CITRIC ACID MONOHYDRATE 500; 334 MG/5ML; MG/5ML
30 SOLUTION ORAL ONCE
Status: COMPLETED | OUTPATIENT
Start: 2023-03-26 | End: 2023-03-26

## 2023-03-26 RX ORDER — OXYTOCIN 10 [USP'U]/ML
INJECTION, SOLUTION INTRAMUSCULAR; INTRAVENOUS AS NEEDED
Status: DISCONTINUED | OUTPATIENT
Start: 2023-03-26 | End: 2023-03-27 | Stop reason: SURG

## 2023-03-26 RX ADMIN — Medication 100 MCG: at 23:33

## 2023-03-26 RX ADMIN — SODIUM CHLORIDE, POTASSIUM CHLORIDE, SODIUM LACTATE AND CALCIUM CHLORIDE 2000 ML: 600; 310; 30; 20 INJECTION, SOLUTION INTRAVENOUS at 22:45

## 2023-03-26 RX ADMIN — EPHEDRINE SULFATE 10 MG: 5 INJECTION INTRAVENOUS at 23:23

## 2023-03-26 RX ADMIN — CEFAZOLIN SODIUM 2 G: 2 SOLUTION INTRAVENOUS at 23:05

## 2023-03-26 RX ADMIN — OXYTOCIN 20 UNITS: 10 INJECTION INTRAVENOUS at 23:40

## 2023-03-26 RX ADMIN — OXYCODONE HYDROCHLORIDE 5 MG: 5 TABLET ORAL at 15:39

## 2023-03-26 RX ADMIN — SODIUM CHLORIDE 500 MG: 900 INJECTION, SOLUTION INTRAVENOUS at 22:45

## 2023-03-26 RX ADMIN — DIPHENHYDRAMINE HYDROCHLORIDE 25 MG: 25 CAPSULE ORAL at 15:39

## 2023-03-26 RX ADMIN — Medication 100 MCG: at 23:23

## 2023-03-26 RX ADMIN — SODIUM CHLORIDE, POTASSIUM CHLORIDE, SODIUM LACTATE AND CALCIUM CHLORIDE: 600; 310; 30; 20 INJECTION, SOLUTION INTRAVENOUS at 23:56

## 2023-03-26 RX ADMIN — ACETAMINOPHEN 1000 MG: 500 TABLET, FILM COATED ORAL at 22:34

## 2023-03-26 RX ADMIN — EPHEDRINE SULFATE 10 MG: 5 INJECTION INTRAVENOUS at 23:44

## 2023-03-26 RX ADMIN — OXYTOCIN 20 UNITS: 10 INJECTION INTRAVENOUS at 23:55

## 2023-03-26 RX ADMIN — SODIUM CHLORIDE, POTASSIUM CHLORIDE, SODIUM LACTATE AND CALCIUM CHLORIDE 25 ML/HR: 600; 310; 30; 20 INJECTION, SOLUTION INTRAVENOUS at 19:45

## 2023-03-26 RX ADMIN — EPHEDRINE SULFATE 10 MG: 5 INJECTION INTRAVENOUS at 23:33

## 2023-03-26 RX ADMIN — FAMOTIDINE 20 MG: 10 INJECTION INTRAVENOUS at 22:20

## 2023-03-26 RX ADMIN — SODIUM CITRATE AND CITRIC ACID MONOHYDRATE 30 ML: 500; 334 SOLUTION ORAL at 22:46

## 2023-03-26 RX ADMIN — PENICILLIN G 3 MILLION UNITS: 3000000 INJECTION, SOLUTION INTRAVENOUS at 22:32

## 2023-03-26 RX ADMIN — PREDNISONE 1 MG: 1 TABLET ORAL at 15:39

## 2023-03-26 RX ADMIN — BUTALBITAL, ACETAMINOPHEN, AND CAFFEINE 2 TABLET: 50; 325; 40 TABLET ORAL at 13:48

## 2023-03-26 RX ADMIN — BUPIVACAINE HYDROCHLORIDE 1.6 ML: 7.5 INJECTION, SOLUTION EPIDURAL; RETROBULBAR at 23:15

## 2023-03-26 RX ADMIN — PENICILLIN G 3 MILLION UNITS: 3000000 INJECTION, SOLUTION INTRAVENOUS at 22:19

## 2023-03-26 RX ADMIN — SODIUM CHLORIDE, POTASSIUM CHLORIDE, SODIUM LACTATE AND CALCIUM CHLORIDE 125 ML/HR: 600; 310; 30; 20 INJECTION, SOLUTION INTRAVENOUS at 22:05

## 2023-03-26 RX ADMIN — METOCLOPRAMIDE 10 MG: 5 TABLET ORAL at 15:39

## 2023-03-26 RX ADMIN — MORPHINE SULFATE 250 MCG: 1 INJECTION EPIDURAL; INTRATHECAL; INTRAVENOUS at 23:15

## 2023-03-26 RX ADMIN — ONDANSETRON 4 MG: 2 INJECTION INTRAMUSCULAR; INTRAVENOUS at 23:05

## 2023-03-26 RX ADMIN — Medication 100 MCG: at 23:44

## 2023-03-26 RX ADMIN — Medication 400 MG: at 20:48

## 2023-03-26 RX ADMIN — SODIUM CHLORIDE, POTASSIUM CHLORIDE, SODIUM LACTATE AND CALCIUM CHLORIDE: 600; 310; 30; 20 INJECTION, SOLUTION INTRAVENOUS at 23:40

## 2023-03-26 NOTE — H&P
OBSTETRICS HISTORY AND PHYSICAL    CHIEF COMPLAINT: Headache    DIAGNOSIS:  Di/Di twins at 34w6d (Vtx/Br)  Severe headache  Gestational hypertension  BMI 37  Previous LSO    ASSESSMENT/PLAN     33 y.o.  at 34w6d with a pregnancy complicated by:     # Severe headache + gHTN  - The headache has not fully resolved with Tylenol, Fioricet and a headache cocktail consisting of Roxicodone/Prednisone/Benadryl/Reglan.  - No vision changes or focal neurological signs  - Reflexes are 2+, no clonus on exam  - BP is mostly normal to borderline, a few mild range BPs today. She has had mild range blood pressures on multiple different dates through February and March.  - Her P/C ratios have been in the 200s, but no overt proteinuria as of yet.  She has not completed a 24-hour urine protein test.  - She had demonstrated elevated liver enzymes (60s), but that has resolved at this point.  No other concerning lab findings.  - This patient met with Dr. Cardona ([facility]) earlier this week to investigate atypical preeclampsia. I called her to discuss the situation again this evening.  - We discussed IV magnesium therapy and delivery, but Dr. Cardona is not convinced that we have a firm diagnosis of preeclampsia at this time.  She recommends an MRA of the head to rule out thrombosis. I discussed with radiology and that particular imaging study can be performed at our facility in the morning.  - Dr. Cardona recommends that we observe this patient overnight and complete a 24-hour urine protein.  We will continue to cycle blood pressures and repeat labs in the morning. She recommends no IV magnesium therapy at this time.  - Tylenol + Roxicodone as needed for headaches  - IV access  - NPO    # Fetal Wellbeing   - Fetal tracing: Cat 1, reactive x 2  - Ultrasound reviewed from [facility] visit on 3/24.  Patient is most comfortable with a  delivery given fetal presentations (cephalic/breech).  - DEENA already received on 3/20-  - Group  "B Streptococcus: not done     # Routine Obstetrics  - Labs reviewed  - MBT: A+    HISTORY OF PRESENT ILLNESS     33 y.o.  at 34w6d who presents with a severe headache that started today.  When the headache did not improve with 2 doses of Tylenol, she presented to labor gross for further assessment and treatment.  She does not have visual disturbance or neurologic signs.  Headache is mainly in the back of her head and down her neck.  No right upper quadrant pain.  Minimal swelling.  She reports feeling \"miserable\" overall.    OB Review of Systems:  Fetal movement: active  Vaginal bleeding: no  Loss of fluid: no  Contractions: no    Preeclampsia Symptoms Review:  Headaches: yes  Vision changes:no  Chest pain and/or shortness of breath: no  RUQ pain: no    REVIEW OF SYSTEMS: A complete review of systems was performed and was specifically negative for headache, changes in vision, RUQ pain, shortness of breath, chest pain, lower extremity edema and dysuria.     HISTORY:  Obstetrical History:  OB History    Para Term  AB Living   2 0 0 0 1 0   SAB IAB Ectopic Molar Multiple Live Births   1 0 0 0 0 0      # Outcome Date GA Lbr Sal/2nd Weight Sex Delivery Anes PTL Lv   2 Current            1 SAB 2019              Obstetric Comments   Fob #1 - Pregnancy #1 and #2    NIPT - Neg - male/female        Past Medical History:  Past Medical History:   Diagnosis Date   • Dichorionic diamniotic twin pregnancy in first trimester 2022    pt reports only having one ovary and one tube (left)   • Female infertility 2018    endometriosis and only one ovary and tube   • GERD (gastroesophageal reflux disease)    • Gestational hypertension     x 1 month / on modified bedrest/ no official diagnosis   • History of bronchitis    • History of endometriosis 2018    dx during surgery to remove tube and ovary (right)   • Missed ab 2019    x1   • Ovarian cyst 2018    grew into a tumor and needed to be removed   • Reflux " esophagitis 1993   • Wears glasses 2000       Past Surgical History:  Past Surgical History:   Procedure Laterality Date   • D & C WITH SUCTION N/A 3/5/2019    Procedure: DILATATION AND CURETTAGE WITH SUCTION;  Surgeon: Jordan Mina MD;  Location: [facility];  Service: Obstetrics/Gynecology   • DIAGNOSTIC LAPAROSCOPY N/A 10/23/2020    Procedure: DIAGNOSTIC LAPAROSCOPY, ablation of endometriosis, chromopertubation;  Surgeon: Brendon Daniels MD;  Location: [facility];  Service: Obstetrics/Gynecology;  Laterality: N/A;   • ENDOSCOPY     • OOPHORECTOMY Left    • OVARIAN CYST DRAINAGE/EXCISION Left 7/24/2018    Procedure: LAPAROSCOP WITH LEFT OOPHORECTOMY with tube and cyst; extensive lysis of adhesions;  Surgeon: Jordan Mina MD;  Location: [facility];  Service: Obstetrics/Gynecology   • WISDOM TOOTH EXTRACTION         Social History:  Social History     Tobacco Use   • Smoking status: Never   • Smokeless tobacco: Never   Vaping Use   • Vaping Use: Never used   Substance Use Topics   • Alcohol use: No   • Drug use: No       Family History  Family History   Problem Relation Age of Onset   • Hypertension Mother    • Diabetes Father           OBJECTIVE   VITALS:  Temp:  [97.7 °F (36.5 °C)-98.3 °F (36.8 °C)] 98.3 °F (36.8 °C)  Heart Rate:  [75-93] 83  Resp:  [16-18] 18  BP: (119-150)/(63-83) 131/73    PHYSICAL EXAM:  GENERAL: NAD, alert  CHEST: No increased work of breathing, CTAB  CV: RRR, WWP  ABDOMEN: Gravid, nontender  EXTREMITIES:  Warm and well-perfused, nontender, nonedematous  NEURO: AAO x 3, CN II-XII grossly intact, 2+ DTRs, no clonus    Fetal Monitoring:  Cat 1, reactive x 2    Allergies: No Known Allergies    No current facility-administered medications on file prior to encounter.     Current Outpatient Medications on File Prior to Encounter   Medication Sig Dispense Refill   • famotidine (PEPCID) 20 MG tablet Take 1 tablet by mouth 2 (Two) Times a Day. 60 tablet 5   • FeroSul 325 (65 Fe) MG  tablet      • pantoprazole (Protonix) 20 MG EC tablet Take 1 tablet by mouth Daily. 30 tablet 3   • Prenatal Vit-Fe Fumarate-FA (PRENATAL VITAMINS PO) Take 1 capsule by mouth Daily.         DIAGNOSTIC STUDIES:  Results from last 7 days   Lab Units 03/26/23  1520 03/24/23  1005 03/20/23  1645   WBC 10*3/mm3 14.08* 17.90* 12.42*   HEMOGLOBIN g/dL 10.6* 9.9* 10.2*   HEMATOCRIT % 32.5* 31.8* 31.3*   PLATELETS 10*3/mm3 278 295 288   SODIUM mmol/L 136  --  135*   POTASSIUM mmol/L 4.1  --  4.0   CHLORIDE mmol/L 101  --  102   CO2 mmol/L 21.8*  --  20.3*   BUN mg/dL 8  --  5*   CREATININE mg/dL 0.54* 0.60 0.59   GLUCOSE mg/dL 93  --  79   CALCIUM mg/dL 8.9  --  8.7   AST (SGOT) U/L 18 25 32   ALT (SGPT) U/L 17 30 47*       Recent Results (from the past 24 hour(s))   POC Urinalysis Dipstick    Collection Time: 03/26/23  1:23 PM    Specimen: Urine, Clean Catch   Result Value Ref Range    Color Yellow Yellow, Straw, Dark Yellow, [place]lei    Clarity, UA Clear Clear    Glucose, UA Negative Negative mg/dL    Bilirubin Negative Negative    Ketones, UA Negative Negative    Specific Gravity  1.010 1.005 - 1.030    Blood, UA Negative Negative    pH, Urine 6.0 5.0 - 8.0    Protein, POC Negative Negative mg/dL    Urobilinogen, UA Normal Normal, 0.2 E.U./dL    Leukocytes Negative Negative    Nitrite, UA Negative Negative   Protein / Creatinine Ratio, Urine - Urine, Clean Catch    Collection Time: 03/26/23  3:10 PM    Specimen: Urine, Clean Catch   Result Value Ref Range    Protein/Creatinine Ratio, Urine 227.6 (H) 0.0 - 200.0 mg/G Crea    Creatinine, Urine 65.9 mg/dL    Total Protein, Urine 15.0 mg/dL   Type & Screen    Collection Time: 03/26/23  3:20 PM    Specimen: Arm, Left; Blood   Result Value Ref Range    ABO Type A     RH type Positive     Antibody Screen Negative     T&S Expiration Date 3/29/2023 11:59:59 PM    Comprehensive Metabolic Panel    Collection Time: 03/26/23  3:20 PM    Specimen: Arm, Left; Blood   Result Value Ref Range     Glucose 93 65 - 99 mg/dL    BUN 8 6 - 20 mg/dL    Creatinine 0.54 (L) 0.57 - 1.00 mg/dL    Sodium 136 136 - 145 mmol/L    Potassium 4.1 3.5 - 5.2 mmol/L    Chloride 101 98 - 107 mmol/L    CO2 21.8 (L) 22.0 - 29.0 mmol/L    Calcium 8.9 8.6 - 10.5 mg/dL    Total Protein 6.6 6.0 - 8.5 g/dL    Albumin 3.5 3.5 - 5.2 g/dL    ALT (SGPT) 17 1 - 33 U/L    AST (SGOT) 18 1 - 32 U/L    Alkaline Phosphatase 176 (H) 39 - 117 U/L    Total Bilirubin 0.2 0.0 - 1.2 mg/dL    Globulin 3.1 gm/dL    A/G Ratio 1.1 g/dL    BUN/Creatinine Ratio 14.8 7.0 - 25.0    Anion Gap 13.2 5.0 - 15.0 mmol/L    eGFR 126.4 >60.0 mL/min/1.73   CBC Auto Differential    Collection Time: 03/26/23  3:20 PM    Specimen: Arm, Left; Blood   Result Value Ref Range    WBC 14.08 (H) 3.40 - 10.80 10*3/mm3    RBC 3.73 (L) 3.77 - 5.28 10*6/mm3    Hemoglobin 10.6 (L) 12.0 - 15.9 g/dL    Hematocrit 32.5 (L) 34.0 - 46.6 %    MCV 87.1 79.0 - 97.0 fL    MCH 28.4 26.6 - 33.0 pg    MCHC 32.6 31.5 - 35.7 g/dL    RDW 16.2 (H) 12.3 - 15.4 %    RDW-SD 51.4 37.0 - 54.0 fl    MPV 11.5 6.0 - 12.0 fL    Platelets 278 140 - 450 10*3/mm3    Neutrophil % 65.7 42.7 - 76.0 %    Lymphocyte % 24.9 19.6 - 45.3 %    Monocyte % 5.8 5.0 - 12.0 %    Eosinophil % 0.5 0.3 - 6.2 %    Basophil % 0.6 0.0 - 1.5 %    Immature Grans % 2.5 (H) 0.0 - 0.5 %    Neutrophils, Absolute 9.26 (H) 1.70 - 7.00 10*3/mm3    Lymphocytes, Absolute 3.50 (H) 0.70 - 3.10 10*3/mm3    Monocytes, Absolute 0.82 0.10 - 0.90 10*3/mm3    Eosinophils, Absolute 0.07 0.00 - 0.40 10*3/mm3    Basophils, Absolute 0.08 0.00 - 0.20 10*3/mm3    Immature Grans, Absolute 0.35 (H) 0.00 - 0.05 10*3/mm3    nRBC 0.2 0.0 - 0.2 /100 WBC   COVID-19,[employer] IN-HOUSE,Nasal Swab No Transport Media 3-4 HR TAT - Swab, Nasal Cavity    Collection Time: 03/26/23  4:41 PM    Specimen: Nasal Cavity; Swab   Result Value Ref Range    COVID19 Not Detected Not Detected - Ref. Range       Brody Farah MD  Obstetrics and Gynecology  [facility]  [place]

## 2023-03-27 PROBLEM — O42.919 PRETERM PREMATURE RUPTURE OF MEMBRANES (PPROM) DELIVERED, CURRENT HOSPITALIZATION: Status: ACTIVE | Noted: 2023-03-27

## 2023-03-27 PROBLEM — Z34.90 PREGNANCY: Status: RESOLVED | Noted: 2023-03-26 | Resolved: 2023-03-27

## 2023-03-27 PROBLEM — O13.3 GESTATIONAL HTN, THIRD TRIMESTER: Status: ACTIVE | Noted: 2023-03-27

## 2023-03-27 LAB
A1 MICROGLOB PLACENTAL VAG QL: POSITIVE
BASOPHILS # BLD AUTO: 0.05 10*3/MM3 (ref 0–0.2)
BASOPHILS NFR BLD AUTO: 0.3 % (ref 0–1.5)
DEPRECATED RDW RBC AUTO: 49.8 FL (ref 37–54)
EOSINOPHIL # BLD AUTO: 0.02 10*3/MM3 (ref 0–0.4)
EOSINOPHIL NFR BLD AUTO: 0.1 % (ref 0.3–6.2)
ERYTHROCYTE [DISTWIDTH] IN BLOOD BY AUTOMATED COUNT: 15.9 % (ref 12.3–15.4)
HCT VFR BLD AUTO: 24.9 % (ref 34–46.6)
HGB BLD-MCNC: 8.1 G/DL (ref 12–15.9)
IMM GRANULOCYTES # BLD AUTO: 0.17 10*3/MM3 (ref 0–0.05)
IMM GRANULOCYTES NFR BLD AUTO: 1.2 % (ref 0–0.5)
LYMPHOCYTES # BLD AUTO: 3.14 10*3/MM3 (ref 0.7–3.1)
LYMPHOCYTES NFR BLD AUTO: 21.6 % (ref 19.6–45.3)
MCH RBC QN AUTO: 28.1 PG (ref 26.6–33)
MCHC RBC AUTO-ENTMCNC: 32.5 G/DL (ref 31.5–35.7)
MCV RBC AUTO: 86.5 FL (ref 79–97)
MONOCYTES # BLD AUTO: 0.95 10*3/MM3 (ref 0.1–0.9)
MONOCYTES NFR BLD AUTO: 6.5 % (ref 5–12)
NEUTROPHILS NFR BLD AUTO: 10.22 10*3/MM3 (ref 1.7–7)
NEUTROPHILS NFR BLD AUTO: 70.3 % (ref 42.7–76)
NRBC BLD AUTO-RTO: 0 /100 WBC (ref 0–0.2)
PLATELET # BLD AUTO: 235 10*3/MM3 (ref 140–450)
PMV BLD AUTO: 10.8 FL (ref 6–12)
RBC # BLD AUTO: 2.88 10*6/MM3 (ref 3.77–5.28)
WBC NRBC COR # BLD: 14.55 10*3/MM3 (ref 3.4–10.8)

## 2023-03-27 PROCEDURE — 85025 COMPLETE CBC W/AUTO DIFF WBC: CPT | Performed by: PHYSICIAN ASSISTANT

## 2023-03-27 PROCEDURE — 63710000001 PROMETHAZINE PER 25 MG: Performed by: OBSTETRICS & GYNECOLOGY

## 2023-03-27 PROCEDURE — 25010000002 ONDANSETRON PER 1 MG: Performed by: OBSTETRICS & GYNECOLOGY

## 2023-03-27 PROCEDURE — 88307 TISSUE EXAM BY PATHOLOGIST: CPT

## 2023-03-27 PROCEDURE — 25010000002 KETOROLAC TROMETHAMINE PER 15 MG: Performed by: OBSTETRICS & GYNECOLOGY

## 2023-03-27 RX ORDER — NICOTINE 21 MG/24HR
1 PATCH, TRANSDERMAL 24 HOURS TRANSDERMAL EVERY 24 HOURS
Status: DISCONTINUED | OUTPATIENT
Start: 2023-03-27 | End: 2023-03-27

## 2023-03-27 RX ORDER — SIMETHICONE 80 MG
80 TABLET,CHEWABLE ORAL 4 TIMES DAILY PRN
Status: DISCONTINUED | OUTPATIENT
Start: 2023-03-27 | End: 2023-03-29 | Stop reason: HOSPADM

## 2023-03-27 RX ORDER — MORPHINE SULFATE 2 MG/ML
2 INJECTION, SOLUTION INTRAMUSCULAR; INTRAVENOUS
Status: ACTIVE | OUTPATIENT
Start: 2023-03-27 | End: 2023-03-28

## 2023-03-27 RX ORDER — METHYLERGONOVINE MALEATE 0.2 MG/ML
200 INJECTION INTRAVENOUS ONCE AS NEEDED
Status: DISCONTINUED | OUTPATIENT
Start: 2023-03-27 | End: 2023-03-27

## 2023-03-27 RX ORDER — MISOPROSTOL 200 UG/1
800 TABLET ORAL AS NEEDED
Status: DISCONTINUED | OUTPATIENT
Start: 2023-03-27 | End: 2023-03-27

## 2023-03-27 RX ORDER — HYDROCORTISONE 25 MG/G
CREAM TOPICAL 3 TIMES DAILY PRN
Status: DISCONTINUED | OUTPATIENT
Start: 2023-03-27 | End: 2023-03-29 | Stop reason: HOSPADM

## 2023-03-27 RX ORDER — OXYTOCIN/0.9 % SODIUM CHLORIDE 30/500 ML
42 PLASTIC BAG, INJECTION (ML) INTRAVENOUS AS NEEDED
Status: DISCONTINUED | OUTPATIENT
Start: 2023-03-27 | End: 2023-03-27

## 2023-03-27 RX ORDER — MAGNESIUM HYDROXIDE 1200 MG/15ML
LIQUID ORAL AS NEEDED
Status: DISCONTINUED | OUTPATIENT
Start: 2023-03-27 | End: 2023-03-27 | Stop reason: HOSPADM

## 2023-03-27 RX ORDER — IBUPROFEN 800 MG/1
800 TABLET ORAL EVERY 8 HOURS
Status: DISCONTINUED | OUTPATIENT
Start: 2023-03-27 | End: 2023-03-29 | Stop reason: HOSPADM

## 2023-03-27 RX ORDER — OXYCODONE HYDROCHLORIDE 5 MG/1
10 TABLET ORAL EVERY 4 HOURS PRN
Status: DISCONTINUED | OUTPATIENT
Start: 2023-03-27 | End: 2023-03-29 | Stop reason: HOSPADM

## 2023-03-27 RX ORDER — ACETAMINOPHEN 500 MG
1000 TABLET ORAL EVERY 8 HOURS
Status: DISCONTINUED | OUTPATIENT
Start: 2023-03-27 | End: 2023-03-29 | Stop reason: HOSPADM

## 2023-03-27 RX ORDER — PRENATAL VIT/IRON FUM/FOLIC AC 27MG-0.8MG
1 TABLET ORAL DAILY
Status: DISCONTINUED | OUTPATIENT
Start: 2023-03-27 | End: 2023-03-29 | Stop reason: HOSPADM

## 2023-03-27 RX ORDER — PROMETHAZINE HYDROCHLORIDE 25 MG/1
25 TABLET ORAL EVERY 6 HOURS PRN
Status: DISCONTINUED | OUTPATIENT
Start: 2023-03-27 | End: 2023-03-29 | Stop reason: HOSPADM

## 2023-03-27 RX ORDER — ONDANSETRON 2 MG/ML
4 INJECTION INTRAMUSCULAR; INTRAVENOUS EVERY 6 HOURS PRN
Status: DISCONTINUED | OUTPATIENT
Start: 2023-03-27 | End: 2023-03-29 | Stop reason: HOSPADM

## 2023-03-27 RX ORDER — PROMETHAZINE HYDROCHLORIDE 12.5 MG/1
12.5 SUPPOSITORY RECTAL EVERY 6 HOURS PRN
Status: DISCONTINUED | OUTPATIENT
Start: 2023-03-27 | End: 2023-03-29 | Stop reason: HOSPADM

## 2023-03-27 RX ORDER — KETOROLAC TROMETHAMINE 30 MG/ML
30 INJECTION, SOLUTION INTRAMUSCULAR; INTRAVENOUS ONCE
Status: COMPLETED | OUTPATIENT
Start: 2023-03-27 | End: 2023-03-27

## 2023-03-27 RX ORDER — ONDANSETRON 4 MG/1
4 TABLET, FILM COATED ORAL EVERY 8 HOURS PRN
Status: DISCONTINUED | OUTPATIENT
Start: 2023-03-27 | End: 2023-03-29 | Stop reason: HOSPADM

## 2023-03-27 RX ORDER — HYDROXYZINE HYDROCHLORIDE 25 MG/1
50 TABLET, FILM COATED ORAL EVERY 6 HOURS PRN
Status: DISCONTINUED | OUTPATIENT
Start: 2023-03-27 | End: 2023-03-29 | Stop reason: HOSPADM

## 2023-03-27 RX ORDER — OXYTOCIN/0.9 % SODIUM CHLORIDE 30/500 ML
333 PLASTIC BAG, INJECTION (ML) INTRAVENOUS ONCE
Status: DISCONTINUED | OUTPATIENT
Start: 2023-03-27 | End: 2023-03-27

## 2023-03-27 RX ORDER — OXYCODONE HYDROCHLORIDE 5 MG/1
5 TABLET ORAL EVERY 4 HOURS PRN
Status: DISCONTINUED | OUTPATIENT
Start: 2023-03-27 | End: 2023-03-29 | Stop reason: HOSPADM

## 2023-03-27 RX ORDER — CARBOPROST TROMETHAMINE 250 UG/ML
250 INJECTION, SOLUTION INTRAMUSCULAR AS NEEDED
Status: DISCONTINUED | OUTPATIENT
Start: 2023-03-27 | End: 2023-03-27

## 2023-03-27 RX ADMIN — KETOROLAC TROMETHAMINE 30 MG: 30 INJECTION, SOLUTION INTRAMUSCULAR; INTRAVENOUS at 00:41

## 2023-03-27 RX ADMIN — IBUPROFEN 800 MG: 800 TABLET, FILM COATED ORAL at 03:14

## 2023-03-27 RX ADMIN — ONDANSETRON 4 MG: 2 INJECTION INTRAMUSCULAR; INTRAVENOUS at 04:31

## 2023-03-27 RX ADMIN — IBUPROFEN 800 MG: 800 TABLET, FILM COATED ORAL at 21:21

## 2023-03-27 RX ADMIN — ACETAMINOPHEN 1000 MG: 500 TABLET, FILM COATED ORAL at 06:25

## 2023-03-27 RX ADMIN — PROMETHAZINE HYDROCHLORIDE 25 MG: 25 TABLET ORAL at 08:59

## 2023-03-27 RX ADMIN — ACETAMINOPHEN 1000 MG: 500 TABLET, FILM COATED ORAL at 17:10

## 2023-03-27 RX ADMIN — ONDANSETRON 4 MG: 2 INJECTION INTRAMUSCULAR; INTRAVENOUS at 10:26

## 2023-03-27 RX ADMIN — ONDANSETRON 4 MG: 2 INJECTION INTRAMUSCULAR; INTRAVENOUS at 17:08

## 2023-03-27 RX ADMIN — OXYCODONE 10 MG: 5 TABLET ORAL at 17:11

## 2023-03-27 NOTE — PLAN OF CARE
Goal Outcome Evaluation:  Plan of Care Reviewed With: patient, spouse        Progress: improving  Outcome Evaluation: Pt is having mild pain, but well controlled. Pruritis on face.  Vaginal bleeding small/scant. Bonding well with baby B.  Breastfeeding with assistance.  Nausea and vomiting noted after Ibuprofen and eating crackers, but pt states she feels better after emesis.  Zofran IV given for N/V.  Will continue to monitor.

## 2023-03-27 NOTE — OP NOTE
Simeon Fink  : 1990  MRN: [id number]  [facility]: [id number]    Operative Report    Pre-Operative Dx:   IUP at 34w6d weeks   Dichorionic, diamniotic twin gestation (Cephalic/Breech)   prelabor rupture of membranes (PPROM) of Baby A  Severe headache  Gestational hypertension  BMI 37  Previous LSO      Postoperative dx:    Same     Procedure: Primary  (LTCS)       Surgeon:    Surgical assistant: GAGANDEEP Boucher was responsible for performing the following activities: Retraction, Suction, Placing Dressing and Delivery of Fetuses and their skilled assistance was necessary for the success of this case.       OR Staff:   Circulator: Moon Sandoval RN  Scrub Person: Nidia Mullen; Litzy Meyers  Baby Nurse: Rita Mari RN       Anesthesia: Spinal       EBL: 850 mls.       Antibiotics: Penicillin followed by Azithromycin 500 mg + Ancef 2 g     Drains:    Specimens: Payne    Placentas     Findings: Normal appearing uterus, right fallopian tube and ovary. Previous LSO.       Infant details        Infant observations: Weight:      Jese Fink [[id number]]   2360 g (5 lb 3.3 oz)      Oliver Fink [[id number]]   2200 g (4 lb 13.6 oz)     Length:      Jese Fink [[id number]]   18.5      Oliver Fink [[id number]]   17.5    in   Apgars:    Jese Fink [[id number]]   6      Oliver Fink B [[id number]]   8    @ 1 minute /       Jese Fink [[id number]]   8      Oliver Fink B [[id number]]   9    @ 5 minutes     Procedure Details:   Please see my separate notes for details regarding her antepartum care.  The patient was taken to the operating room where regional anesthesia was found to be adequate. She was prepared and draped in the normal sterile fashion in the dorsal supine position with a leftward tilt. A Pfannenstiel skin incision was made with the scalpel and carried through to the underlying layer of  fascia. The fascia was incised in the midline and the incision extended laterally with the Toledo scissors. The superior aspect of the fascial incision was grasped with the Kocher clamps, elevated and the underlying rectus muscles dissected off sharply. Attention was then turned to the inferior aspect of the fascial incision, which was grasped and tented up with the Kocher clamps. The underlying muscles were dissected off in a similar fashion. The rectus muscles were then  in the midline, and the peritoneum identified, and entered bluntly. The peritoneal incision was extended superiorly and inferiorly with good visualization of the bladder. The Kirill retractor was inserted atraumatically. The vesicouterine peritoneum was identified, grasped with the pickups, entered sharply, and the bladder flap was created digitally.     A low transverse uterine incision was made with the scalpel well above the bladder reflection and extended in a cephalad/caudad fashion. Infant A's head was delivered atraumatically followed by the shoulders and body. The infant was vigorous and cord clamping was delayed x 30 seconds with stimulation and suctioning of the nose and mouth. The cord was then clamped and cut and the infant was handed off to waiting staff.  The bag of water for baby B was then ruptured with an Allis clamp and the infant was delivered using standard breech maneuvers. The infant was vigorous and cord clamping was delayed x 30 seconds with stimulation and suctioning of the nose and mouth. The cord was then clamped and cut and the infant was handed off to waiting staff.      The placentas were then removed with gentle traction on the cord and uterine massage.  The uterus was cleared of all clots and debris with a wet lap sponge.  The uterine incision was repaired with a 0 monocryl in a running locked fashion. A second imbricating layer was applied with the same suture. The Kirill retractor was removed and the  gutters were cleared of all clots.  The uterine incision was reinspected and made sufficiently hemostatic with Bovie and Surgicel.    The fascia was elevated and the rectus muscles and subfascial tissues were made hemostatic with the bovie. The peritoneum was closed with 3-0 chromic in a running fashion. The fascia was closed with 0 PDS in a running fashion.  The subcutaneous tissues were then irrigated and the bovie was used to achieve satisfactory hemostasis. The subcutaneous space was closed with 3-0 chromic. The skin was closed with Insorb staples. Sponge, lap, needle, and instrument counts were correct per the OR staff.  The patient tolerated the procedure well and was taken to the recovery room in stable condition. She will be admitted to the postpartum unit for her post-operative care.      Complications:   None      Disposition:   Mother to Mother Baby/Postpartum in stable condition currently.   Babies to the  nursery in stable condition currently.     Brody Farah MD  Obstetrics and Gynecology  [facility]  3/27/2023  00:46 EDT

## 2023-03-27 NOTE — PROGRESS NOTES
[facility]   PROGRESS NOTE    Post-Op 8 hours S/P   Subjective   Subjective  Patient reports:  Pain is well controlled. She has not ambulated yet. Tolerating liquids. Nausea better.     Voiding - has not voided without cath yet;  No flatus reported.  Vaginal bleeding is as much as expected.    Objective    Objective     Vitals: Vital Signs Range for the last 24 hours  Temperature: Temp:  [97.1 °F (36.2 °C)-98.3 °F (36.8 °C)] 97.9 °F (36.6 °C)   Temp Source: Temp src: Temporal   BP: BP: (106-150)/() 112/65   Pulse: Heart Rate:  [66-93] 76   Respirations: Resp:  [14-18] 18   SPO2: SpO2:  [94 %-100 %] 98 %   O2 Amount (l/min):     O2 Devices Device (Oxygen Therapy): room air   Weight: Weight:  [94.5 kg (208 lb 6.4 oz)] 94.5 kg (208 lb 6.4 oz)            Physical Exam    Lungs Respirations even and unlabored   Abdomen Soft, appropriately tender   Incision  dressed   Extremities SCD on          Assessment & Plan        Headache in pregnancy    Twin pregnancy, dichorionic/diamniotic, unspecified trimester    Gestational HTN, third trimester     premature rupture of membranes (PPROM) delivered, current hospitalization    Assessment & Plan    Assessment:    Kely Fink is 8 hours post-partum       Jese Fink [[id number]]   , Low Transverse      Oliver Fink [[id number]]   , Low Transverse         Jese Fink [[id number]]         Oliver Fink [[id number]]      .      Plan:  continue post op care.      Thais Lindsay PA-C  23  08:01 EDT

## 2023-03-27 NOTE — PROGRESS NOTES
I was notified that the patient experienced spontaneous rupture of membranes.  AmniSure testing was positive.  Gross rupture on exam.  I called into the patient's room to discuss the situation with her by phone. Given her gestational age, I do recommend moving forward with delivery.  Penicillin is being started as well.  We discussed our hospital's 35-week policy again.  We discussed possible transfer to [facility]. The patient would much prefer to deliver with our facility if possible. Given that we will only be short of the 35 week ethan by an hour or so, I think this is reasonable.  She has received steroid injections.  We will plan to have the pediatrician present for delivery.  We discussed that the babies may require transfer to a higher level of care following delivery.  We always try to keep the family together if possible, but sometimes bed availability is such that mom may be  from babies.  She understands both the risks of transfer and the risks of delivery at our facility and chooses to stay with us.  We discussed  for route of delivery as this is still her desire.  Risks for that procedure were reviewed.  All questions answered.    Brody Farah MD  Obstetrics and Gynecology  [facility]

## 2023-03-27 NOTE — PAYOR COMM NOTE
"TO:[employer]  FROM:JENNYFER KAT, RN PHONE [phone]  INPT NOTIFICATION/CLINICALS   EDC 23@34 6/7 WEEKS TWINS  DELIVERED TWIN A 3/26@2338 MALE 5#3OZ APG 6/8 C SECTION  DELIVERED TWIN B 3/26@2339 FEMALE 4#13OZ APG 8/9 C SECTION    Kely Fink (33 y.o. Female)     Date of Birth   1990    Social Security Number       Address   [address] [place] [address]    Home Phone   [phone]    MRN   [id number]       Catholic   Mormonism    Marital Status                               Admission Date   3/26/23    Admission Type   Elective    Admitting Provider   Brody Farah MD    Attending Provider   Brody Farah MD    Department, Room/Bed   [facility] OB GYN, W2       Discharge Date       Discharge Disposition       Discharge Destination                               Attending Provider: Brody Farah MD    Allergies: No Known Allergies    Isolation: None   Infection: None   Code Status: CPR    Ht: 160 cm (63\")   Wt: 94.5 kg (208 lb 6.4 oz)    Admission Cmt: None   Principal Problem: Headache in pregnancy [O26.899,R51.9]                 Active Insurance as of 3/26/2023     Primary Coverage     Payor Plan Insurance Group Employer/Plan Group    [employer] [place] [employer]ICA[employer]      Payor Plan Address Payor Plan Phone Number Payor Plan Fax Number Effective Dates    [address] [address] [phone]  2022 - None Entered    [facility] [id number]       Subscriber Name Subscriber Birth Date Member ID       KELY FINK 1990 [id number]                 Emergency Contacts      (Rel.) Home Phone Work Phone Mobile Phone    Eric Fink (Spouse) [phone] -- [phone]               History & Physical      Brody Farah MD at 23 1944          OBSTETRICS HISTORY AND PHYSICAL    CHIEF COMPLAINT: Headache    DIAGNOSIS:  Di/Di twins at 34w6d (Vtx/Br)  Severe headache  Gestational hypertension  BMI 37  Previous " LSO    ASSESSMENT/PLAN     33 y.o.  at 34w6d with a pregnancy complicated by:     # Severe headache + gHTN  - The headache has not fully resolved with Tylenol, Fioricet and a headache cocktail consisting of Roxicodone/Prednisone/Benadryl/Reglan.  - No vision changes or focal neurological signs  - Reflexes are 2+, no clonus on exam  - BP is mostly normal to borderline, a few mild range BPs today. She has had mild range blood pressures on multiple different dates through February and March.  - Her P/C ratios have been in the 200s, but no overt proteinuria as of yet.  She has not completed a 24-hour urine protein test.  - She had demonstrated elevated liver enzymes (60s), but that has resolved at this point.  No other concerning lab findings.  - This patient met with Dr. Cardona ([facility]) earlier this week to investigate atypical preeclampsia. I called her to discuss the situation again this evening.  - We discussed IV magnesium therapy and delivery, but Dr. Cardona is not convinced that we have a firm diagnosis of preeclampsia at this time.  She recommends an MRA of the head to rule out thrombosis. I discussed with radiology and that particular imaging study can be performed at our facility in the morning.  - Dr. Cardona recommends that we observe this patient overnight and complete a 24-hour urine protein.  We will continue to cycle blood pressures and repeat labs in the morning. She recommends no IV magnesium therapy at this time.  - Tylenol + Roxicodone as needed for headaches  - IV access  - NPO    # Fetal Wellbeing   - Fetal tracing: Cat 1, reactive x 2  - Ultrasound reviewed from [facility] visit on 3/24.  Patient is most comfortable with a  delivery given fetal presentations (cephalic/breech).  - BMZ already received on 3/20-  - Group B Streptococcus: not done     # Routine Obstetrics  - Labs reviewed  - MBT: A+    HISTORY OF PRESENT ILLNESS     33 y.o.  at 34w6d who presents with a severe  "headache that started today.  When the headache did not improve with 2 doses of Tylenol, she presented to labor gross for further assessment and treatment.  She does not have visual disturbance or neurologic signs.  Headache is mainly in the back of her head and down her neck.  No right upper quadrant pain.  Minimal swelling.  She reports feeling \"miserable\" overall.    OB Review of Systems:  Fetal movement: active  Vaginal bleeding: no  Loss of fluid: no  Contractions: no    Preeclampsia Symptoms Review:  Headaches: yes  Vision changes:no  Chest pain and/or shortness of breath: no  RUQ pain: no    REVIEW OF SYSTEMS: A complete review of systems was performed and was specifically negative for headache, changes in vision, RUQ pain, shortness of breath, chest pain, lower extremity edema and dysuria.     HISTORY:  Obstetrical History:  OB History    Para Term  AB Living   2 0 0 0 1 0   SAB IAB Ectopic Molar Multiple Live Births   1 0 0 0 0 0      # Outcome Date GA Lbr Sal/2nd Weight Sex Delivery Anes PTL Lv   2 Current            1 SAB 2019              Obstetric Comments   Fob #1 - Pregnancy #1 and #2    NIPT - Neg - male/female        Past Medical History:  Past Medical History:   Diagnosis Date   • Dichorionic diamniotic twin pregnancy in first trimester 2022    pt reports only having one ovary and one tube (left)   • Female infertility 2018    endometriosis and only one ovary and tube   • GERD (gastroesophageal reflux disease)    • Gestational hypertension     x 1 month / on modified bedrest/ no official diagnosis   • History of bronchitis    • History of endometriosis 2018    dx during surgery to remove tube and ovary (right)   • Missed ab 2019    x1   • Ovarian cyst 2018    grew into a tumor and needed to be removed   • Reflux esophagitis    • Wears glasses        Past Surgical History:  Past Surgical History:   Procedure Laterality Date   • D & C WITH SUCTION N/A 3/5/2019    " Procedure: DILATATION AND CURETTAGE WITH SUCTION;  Surgeon: Jordan Mina MD;  Location: [facility] OR;  Service: Obstetrics/Gynecology   • DIAGNOSTIC LAPAROSCOPY N/A 10/23/2020    Procedure: DIAGNOSTIC LAPAROSCOPY, ablation of endometriosis, chromopertubation;  Surgeon: Brendon Daniels MD;  Location: [facility] OR;  Service: Obstetrics/Gynecology;  Laterality: N/A;   • ENDOSCOPY     • OOPHORECTOMY Left    • OVARIAN CYST DRAINAGE/EXCISION Left 7/24/2018    Procedure: LAPAROSCOP WITH LEFT OOPHORECTOMY with tube and cyst; extensive lysis of adhesions;  Surgeon: Jordan Mina MD;  Location: [facility] OR;  Service: Obstetrics/Gynecology   • WISDOM TOOTH EXTRACTION         Social History:  Social History     Tobacco Use   • Smoking status: Never   • Smokeless tobacco: Never   Vaping Use   • Vaping Use: Never used   Substance Use Topics   • Alcohol use: No   • Drug use: No       Family History  Family History   Problem Relation Age of Onset   • Hypertension Mother    • Diabetes Father           OBJECTIVE   VITALS:  Temp:  [97.7 °F (36.5 °C)-98.3 °F (36.8 °C)] 98.3 °F (36.8 °C)  Heart Rate:  [75-93] 83  Resp:  [16-18] 18  BP: (119-150)/(63-83) 131/73    PHYSICAL EXAM:  GENERAL: NAD, alert  CHEST: No increased work of breathing, CTAB  CV: RRR, WWP  ABDOMEN: Gravid, nontender  EXTREMITIES:  Warm and well-perfused, nontender, nonedematous  NEURO: AAO x 3, CN II-XII grossly intact, 2+ DTRs, no clonus    Fetal Monitoring:  Cat 1, reactive x 2    Allergies: No Known Allergies    No current facility-administered medications on file prior to encounter.     Current Outpatient Medications on File Prior to Encounter   Medication Sig Dispense Refill   • famotidine (PEPCID) 20 MG tablet Take 1 tablet by mouth 2 (Two) Times a Day. 60 tablet 5   • FeroSul 325 (65 Fe) MG tablet      • pantoprazole (Protonix) 20 MG EC tablet Take 1 tablet by mouth Daily. 30 tablet 3   • Prenatal Vit-Fe Fumarate-FA (PRENATAL VITAMINS PO) Take 1  capsule by mouth Daily.         DIAGNOSTIC STUDIES:  Results from last 7 days   Lab Units 03/26/23  1520 03/24/23  1005 03/20/23  1645   WBC 10*3/mm3 14.08* 17.90* 12.42*   HEMOGLOBIN g/dL 10.6* 9.9* 10.2*   HEMATOCRIT % 32.5* 31.8* 31.3*   PLATELETS 10*3/mm3 278 295 288   SODIUM mmol/L 136  --  135*   POTASSIUM mmol/L 4.1  --  4.0   CHLORIDE mmol/L 101  --  102   CO2 mmol/L 21.8*  --  20.3*   BUN mg/dL 8  --  5*   CREATININE mg/dL 0.54* 0.60 0.59   GLUCOSE mg/dL 93  --  79   CALCIUM mg/dL 8.9  --  8.7   AST (SGOT) U/L 18 25 32   ALT (SGPT) U/L 17 30 47*       Recent Results (from the past 24 hour(s))   POC Urinalysis Dipstick    Collection Time: 03/26/23  1:23 PM    Specimen: Urine, Clean Catch   Result Value Ref Range    Color Yellow Yellow, Straw, Dark Yellow, Lorelei    Clarity, UA Clear Clear    Glucose, UA Negative Negative mg/dL    Bilirubin Negative Negative    Ketones, UA Negative Negative    Specific Gravity  1.010 1.005 - 1.030    Blood, UA Negative Negative    pH, Urine 6.0 5.0 - 8.0    Protein, POC Negative Negative mg/dL    Urobilinogen, UA Normal Normal, 0.2 E.U./dL    Leukocytes Negative Negative    Nitrite, UA Negative Negative   Protein / Creatinine Ratio, Urine - Urine, Clean Catch    Collection Time: 03/26/23  3:10 PM    Specimen: Urine, Clean Catch   Result Value Ref Range    Protein/Creatinine Ratio, Urine 227.6 (H) 0.0 - 200.0 mg/G Crea    Creatinine, Urine 65.9 mg/dL    Total Protein, Urine 15.0 mg/dL   Type & Screen    Collection Time: 03/26/23  3:20 PM    Specimen: Arm, Left; Blood   Result Value Ref Range    ABO Type A     RH type Positive     Antibody Screen Negative     T&S Expiration Date 3/29/2023 11:59:59 PM    Comprehensive Metabolic Panel    Collection Time: 03/26/23  3:20 PM    Specimen: Arm, Left; Blood   Result Value Ref Range    Glucose 93 65 - 99 mg/dL    BUN 8 6 - 20 mg/dL    Creatinine 0.54 (L) 0.57 - 1.00 mg/dL    Sodium 136 136 - 145 mmol/L    Potassium 4.1 3.5 - 5.2 mmol/L     Chloride 101 98 - 107 mmol/L    CO2 21.8 (L) 22.0 - 29.0 mmol/L    Calcium 8.9 8.6 - 10.5 mg/dL    Total Protein 6.6 6.0 - 8.5 g/dL    Albumin 3.5 3.5 - 5.2 g/dL    ALT (SGPT) 17 1 - 33 U/L    AST (SGOT) 18 1 - 32 U/L    Alkaline Phosphatase 176 (H) 39 - 117 U/L    Total Bilirubin 0.2 0.0 - 1.2 mg/dL    Globulin 3.1 gm/dL    A/G Ratio 1.1 g/dL    BUN/Creatinine Ratio 14.8 7.0 - 25.0    Anion Gap 13.2 5.0 - 15.0 mmol/L    eGFR 126.4 >60.0 mL/min/1.73   CBC Auto Differential    Collection Time: 03/26/23  3:20 PM    Specimen: Arm, Left; Blood   Result Value Ref Range    WBC 14.08 (H) 3.40 - 10.80 10*3/mm3    RBC 3.73 (L) 3.77 - 5.28 10*6/mm3    Hemoglobin 10.6 (L) 12.0 - 15.9 g/dL    Hematocrit 32.5 (L) 34.0 - 46.6 %    MCV 87.1 79.0 - 97.0 fL    MCH 28.4 26.6 - 33.0 pg    MCHC 32.6 31.5 - 35.7 g/dL    RDW 16.2 (H) 12.3 - 15.4 %    RDW-SD 51.4 37.0 - 54.0 fl    MPV 11.5 6.0 - 12.0 fL    Platelets 278 140 - 450 10*3/mm3    Neutrophil % 65.7 42.7 - 76.0 %    Lymphocyte % 24.9 19.6 - 45.3 %    Monocyte % 5.8 5.0 - 12.0 %    Eosinophil % 0.5 0.3 - 6.2 %    Basophil % 0.6 0.0 - 1.5 %    Immature Grans % 2.5 (H) 0.0 - 0.5 %    Neutrophils, Absolute 9.26 (H) 1.70 - 7.00 10*3/mm3    Lymphocytes, Absolute 3.50 (H) 0.70 - 3.10 10*3/mm3    Monocytes, Absolute 0.82 0.10 - 0.90 10*3/mm3    Eosinophils, Absolute 0.07 0.00 - 0.40 10*3/mm3    Basophils, Absolute 0.08 0.00 - 0.20 10*3/mm3    Immature Grans, Absolute 0.35 (H) 0.00 - 0.05 10*3/mm3    nRBC 0.2 0.0 - 0.2 /100 WBC   COVID-19,[employer] IN-HOUSE,Nasal Swab No Transport Media 3-4 HR TAT - Swab, Nasal Cavity    Collection Time: 03/26/23  4:41 PM    Specimen: Nasal Cavity; Swab   Result Value Ref Range    COVID19 Not Detected Not Detected - Ref. Range       Brody Farah MD  Obstetrics and Gynecology  [facility]      Electronically signed by Brody Farah MD at 03/26/23 2035       Operative/Procedure Notes (last 24 hours)  Notes from 03/26/23 0608  through 23   No notes of this type exist for this encounter.            Physician Progress Notes (last 24 hours)      Brody Farah MD at 23 4840        I was notified that the patient experienced spontaneous rupture of membranes.  AmniSure testing was positive.  Gross rupture on exam.  I called into the patient's room to discuss the situation with her by phone. Given her gestational age, I do recommend moving forward with delivery.  Penicillin is being started as well.  We discussed our hospital's 35-week policy again.  We discussed possible transfer to [facility]. The patient would much prefer to deliver with our facility if possible. Given that we will only be short of the 35 week ethan by an hour or so, I think this is reasonable.  She has received steroid injections.  We will plan to have the pediatrician present for delivery.  We discussed that the babies may require transfer to a higher level of care following delivery.  We always try to keep the family together if possible, but sometimes bed availability is such that mom may be  from babies.  She understands both the risks of transfer and the risks of delivery at our facility and chooses to stay with us.  We discussed  for route of delivery as this is still her desire.  Risks for that procedure were reviewed.  All questions answered.    Brody Farah MD  Obstetrics and Gynecology  [facility]      Electronically signed by Brody Farah MD at 23 2198

## 2023-03-27 NOTE — ANESTHESIA PREPROCEDURE EVALUATION
Anesthesia Evaluation     Patient summary reviewed and Nursing notes reviewed   NPO Solid Status: > 8 hours  NPO Liquid Status: > 2 hours           Airway   Mallampati: II  TM distance: >3 FB  Neck ROM: full  Possible difficult intubation  Dental - normal exam     Pulmonary - negative pulmonary ROS and normal exam   (-) not a smoker  Cardiovascular - normal exam    (+) hypertension,       Neuro/Psych  (+) headaches,    GI/Hepatic/Renal/Endo    (+)  GERD,      Musculoskeletal (-) negative ROS    Abdominal    Substance History - negative use     OB/GYN    (+) Pregnant, pregnancy induced hypertension        Other - negative ROS       ROS/Med Hx Other:   Twins                  Anesthesia Plan    ASA 2 - emergent     spinal     (Risks of spinal anesthesia discussed , including but not limited to:  Headache, itching, nausea and vomiting, infection, failure of the block, decreased BP, permanent chronic back pain, nerve damage, paralysis, etc.    )    Anesthetic plan, risks, benefits, and alternatives have been provided, discussed and informed consent has been obtained with: patient.  Pre-procedure education provided  Plan discussed with CRNA.        CODE STATUS:    Level Of Support Discussed With: Patient  Code Status (Patient has no pulse and is not breathing): CPR (Attempt to Resuscitate)  Medical Interventions (Patient has pulse or is breathing): Full Support  Release to patient: Routine Release

## 2023-03-27 NOTE — ANESTHESIA POSTPROCEDURE EVALUATION
Patient: Kely Fink    Procedure Summary     Date: 23 Room / Location: [facility] OR  /  [facility]    Anesthesia Start:  Anesthesia Stop: 23    Procedure:  SECTION PRIMARY (Abdomen) Diagnosis:       Twin pregnancy, dichorionic/diamniotic, unspecified trimester      (Twin pregnancy, dichorionic/diamniotic, unspecified trimester [O30.049])    Surgeons: Brody Farah MD Provider: Vik Greer CRNA    Anesthesia Type: spinal ASA Status: 2 - Emergent          Anesthesia Type: spinal    Vitals  No vitals data found for the desired time range.          Post Anesthesia Care and Evaluation    Patient location during evaluation: PACU  Patient participation: complete - patient participated  Level of consciousness: awake and alert  Pain score: 0  Pain management: satisfactory to patient    Airway patency: patent  Anesthetic complications: No anesthetic complications  PONV Status: none  Cardiovascular status: acceptable and stable  Respiratory status: acceptable, nonlabored ventilation, spontaneous ventilation and unassisted  Hydration status: acceptable  Post Neuraxial Block status: No signs or symptoms of PDPH  Comments: Vitals signs as noted in nursing documentation as per protocol.

## 2023-03-27 NOTE — ANESTHESIA PROCEDURE NOTES
Spinal Block    Pre-sedation assessment completed: 3/26/2023 11:10 PM    Patient reassessed immediately prior to procedure    Patient location during procedure: OR  Start Time: 3/26/2023 11:14 PM  Stop Time: 3/26/2023 11:16 PM  Indication:at surgeon's request and procedure for pain  Performed By  CRNA/CAA: Vik Greer, CRNA  Preanesthetic Checklist  Completed: patient identified, IV checked, site marked, risks and benefits discussed, surgical consent, monitors and equipment checked, pre-op evaluation and timeout performed  Spinal Block Prep:  Patient Position:sitting  Sterile Tech:cap, gloves, mask and sterile barriers  Prep:Chloraprep  Patient Monitoring:blood pressure monitoring, continuous pulse oximetry and EKG    Spinal Block Procedure  Approach:midline  Guidance:landmark technique and palpation technique  Location:L4-L5  Needle Type:Sandra  Needle Gauge:25 G  Placement of Spinal needle event:cerebrospinal fluid aspirated  Paresthesia: no  Fluid Appearance:clear  Medications: bupivacaine PF (MARCAINE) injection 0.75% - Intrathecal   1.6 mL - 3/26/2023 11:15:00 PM   Post Assessment  Patient Tolerance:patient tolerated the procedure well with no apparent complications  Complications no  Additional Notes  Risks discussed , including but not limited to:  Headache, itching, n&v, infection, failure, decreased blood pressure, permanent/chronic back pain, nerve damage, paralysis, etc. All questions answered and informed consent obtained. Skin localized with lidocaine skin wheel. 1.6 ml 0.75% Marcaine with dextrose intrathecal

## 2023-03-27 NOTE — CONSULTS
Patient: Kely Fink  Procedure(s):   SECTION PRIMARY  Anesthesia type: spinal    Patient location: Labor and Delivery  Last vitals:   Vitals:    23 0500   BP: 112/65   Pulse:    Resp:    Temp: 97.9 °F (36.6 °C)   SpO2: 98%     Level of consciousness: awake, alert and oriented    Post-anesthesia pain: adequate analgesia  Airway patency: patent  Respiratory: unassisted  Cardiovascular: stable and blood pressure at baseline  Hydration: euvolemic    Anesthetic complications: no, pt has not been OOB, fatima catheter removed this AM, denies residual numbness or headache, pain controlled, instructed to contact anesthesia if patient is unable to urinate. RN at bedside

## 2023-03-28 LAB
BASOPHILS # BLD AUTO: 0.05 10*3/MM3 (ref 0–0.2)
BASOPHILS NFR BLD AUTO: 0.4 % (ref 0–1.5)
DEPRECATED RDW RBC AUTO: 52.4 FL (ref 37–54)
EOSINOPHIL # BLD AUTO: 0.08 10*3/MM3 (ref 0–0.4)
EOSINOPHIL NFR BLD AUTO: 0.6 % (ref 0.3–6.2)
ERYTHROCYTE [DISTWIDTH] IN BLOOD BY AUTOMATED COUNT: 16.3 % (ref 12.3–15.4)
HCT VFR BLD AUTO: 24.3 % (ref 34–46.6)
HGB BLD-MCNC: 7.7 G/DL (ref 12–15.9)
IMM GRANULOCYTES # BLD AUTO: 0.19 10*3/MM3 (ref 0–0.05)
IMM GRANULOCYTES NFR BLD AUTO: 1.4 % (ref 0–0.5)
LYMPHOCYTES # BLD AUTO: 3.84 10*3/MM3 (ref 0.7–3.1)
LYMPHOCYTES NFR BLD AUTO: 27.9 % (ref 19.6–45.3)
MCH RBC QN AUTO: 28.3 PG (ref 26.6–33)
MCHC RBC AUTO-ENTMCNC: 31.7 G/DL (ref 31.5–35.7)
MCV RBC AUTO: 89.3 FL (ref 79–97)
MONOCYTES # BLD AUTO: 1.1 10*3/MM3 (ref 0.1–0.9)
MONOCYTES NFR BLD AUTO: 8 % (ref 5–12)
NEUTROPHILS NFR BLD AUTO: 61.7 % (ref 42.7–76)
NEUTROPHILS NFR BLD AUTO: 8.49 10*3/MM3 (ref 1.7–7)
NRBC BLD AUTO-RTO: 0 /100 WBC (ref 0–0.2)
PLATELET # BLD AUTO: 255 10*3/MM3 (ref 140–450)
PMV BLD AUTO: 11.5 FL (ref 6–12)
RBC # BLD AUTO: 2.72 10*6/MM3 (ref 3.77–5.28)
REF LAB TEST METHOD: NORMAL
WBC NRBC COR # BLD: 13.75 10*3/MM3 (ref 3.4–10.8)

## 2023-03-28 PROCEDURE — 85025 COMPLETE CBC W/AUTO DIFF WBC: CPT | Performed by: OBSTETRICS & GYNECOLOGY

## 2023-03-28 RX ORDER — FERROUS SULFATE TAB EC 324 MG (65 MG FE EQUIVALENT) 324 (65 FE) MG
324 TABLET DELAYED RESPONSE ORAL 2 TIMES DAILY WITH MEALS
Status: DISCONTINUED | OUTPATIENT
Start: 2023-03-28 | End: 2023-03-29 | Stop reason: HOSPADM

## 2023-03-28 RX ADMIN — IBUPROFEN 800 MG: 800 TABLET, FILM COATED ORAL at 20:43

## 2023-03-28 RX ADMIN — ACETAMINOPHEN 1000 MG: 500 TABLET, FILM COATED ORAL at 01:41

## 2023-03-28 RX ADMIN — IBUPROFEN 800 MG: 800 TABLET, FILM COATED ORAL at 12:41

## 2023-03-28 RX ADMIN — FERROUS SULFATE TAB EC 324 MG (65 MG FE EQUIVALENT) 324 MG: 324 (65 FE) TABLET DELAYED RESPONSE at 17:00

## 2023-03-28 RX ADMIN — ACETAMINOPHEN 1000 MG: 500 TABLET, FILM COATED ORAL at 17:01

## 2023-03-28 RX ADMIN — IBUPROFEN 800 MG: 800 TABLET, FILM COATED ORAL at 05:21

## 2023-03-28 RX ADMIN — PRENATAL VITAMINS-IRON FUMARATE 27 MG IRON-FOLIC ACID 0.8 MG TABLET 1 TABLET: at 08:43

## 2023-03-28 RX ADMIN — ACETAMINOPHEN 1000 MG: 500 TABLET, FILM COATED ORAL at 08:43

## 2023-03-28 NOTE — PLAN OF CARE
Goal Outcome Evaluation:  Plan of Care Reviewed With: patient           Outcome Evaluation: VSS, pain well controlled with PO meds, Fundus and lochia WNL, bonding well with infants, switched to bottles for babies. Ambulated without difficulty, showered today and dressing removed.

## 2023-03-28 NOTE — PLAN OF CARE
Problem: Adult Inpatient Plan of Care  Goal: Plan of Care Review  Outcome: Ongoing, Progressing  Goal: Patient-Specific Goal (Individualized)  Outcome: Ongoing, Progressing  Goal: Absence of Hospital-Acquired Illness or Injury  Outcome: Ongoing, Progressing  Intervention: Identify and Manage Fall Risk  Recent Flowsheet Documentation  Taken 3/28/2023 0521 by Magali Solano RN  Safety Promotion/Fall Prevention: safety round/check completed  Taken 3/28/2023 0400 by Magali Solano RN  Safety Promotion/Fall Prevention: safety round/check completed  Taken 3/28/2023 0200 by Magali Solano RN  Safety Promotion/Fall Prevention: safety round/check completed  Taken 3/27/2023 2200 by Magali Solano RN  Safety Promotion/Fall Prevention: safety round/check completed  Intervention: Prevent Skin Injury  Recent Flowsheet Documentation  Taken 3/27/2023 2100 by Magali Solano RN  Body Position: position changed independently  Intervention: Prevent and Manage VTE (Venous Thromboembolism) Risk  Recent Flowsheet Documentation  Taken 3/27/2023 2100 by Magali Solano RN  VTE Prevention/Management: sequential compression devices off  Goal: Optimal Comfort and Wellbeing  Outcome: Ongoing, Progressing  Intervention: Monitor Pain and Promote Comfort  Recent Flowsheet Documentation  Taken 3/28/2023 0521 by Magali Solano RN  Pain Management Interventions: see MAR  Taken 3/27/2023 2121 by Magali Solano RN  Pain Management Interventions:   see MAR   cold applied  Intervention: Provide Person-Centered Care  Recent Flowsheet Documentation  Taken 3/27/2023 2100 by Magali Solano RN  Trust Relationship/Rapport:   care explained   choices provided   questions answered   questions encouraged   thoughts/feelings acknowledged  Goal: Readiness for Transition of Care  Outcome: Ongoing, Progressing   Goal Outcome Evaluation:

## 2023-03-28 NOTE — PROGRESS NOTES
Simeon Fink  : 1990  MRN: [id number]  CSN: [id number]    Post-operative Day #2  Subjective   Her pain is well controlled.  Vaginal bleeding is appropriate amount.  She is voiding without difficulty. She is not passing gas and has not had a bowel movement.  Upon review of her respiratory system, she has no respiratory symptoms and and denies SOB, cough, wheezing, chest pain. She is both breast and bottle feeding. She denied any dizziness while ambulating yesterday, hasn't been up today.     Objective     Min/max vitals past 24 hours:   Temp  Min: 98 °F (36.7 °C)  Max: 98.3 °F (36.8 °C)  BP  Min: 106/66  Max: 127/76  Pulse  Min: 72  Max: 84  Resp  Min: 17  Max: 18        General: well developed; well nourished  no acute distress   Resp: breathing is unlabored   CV: Not performed.   Abdomen: incision is covered by bandage  fundus firm and non-tender   Pelvic: Not performed   Ext: normal appearance with no cyanosis or edema and no calf tenderness     Results from last 7 days   Lab Units 23  0549 23  0821 23  1520   WBC 10*3/mm3 13.75* 14.55* 14.08*   HEMOGLOBIN g/dL 7.7* 8.1* 10.6*   HEMATOCRIT % 24.3* 24.9* 32.5*   PLATELETS 10*3/mm3 255 235 278     Lab Results   Component Value Date    ABO A 2023    RH Positive 2023    RUBELLAABIGG 1.98 2022    HEPBSAG Negative 2022        Assessment   1. POD #2 S/P Primary  (LTCS)   2. Anemia     Plan   1. Continue routine postpartum care   2. Ferrous Sulfate BID    Vashti Verdugo, APRN  3/28/2023  08:46 EDT

## 2023-03-29 VITALS
HEIGHT: 63 IN | OXYGEN SATURATION: 97 % | RESPIRATION RATE: 18 BRPM | SYSTOLIC BLOOD PRESSURE: 136 MMHG | DIASTOLIC BLOOD PRESSURE: 80 MMHG | WEIGHT: 208.4 LBS | HEART RATE: 74 BPM | TEMPERATURE: 97.8 F | BODY MASS INDEX: 36.93 KG/M2

## 2023-03-29 LAB
BASOPHILS # BLD AUTO: 0.07 10*3/MM3 (ref 0–0.2)
BASOPHILS NFR BLD AUTO: 0.6 % (ref 0–1.5)
DEPRECATED RDW RBC AUTO: 51.9 FL (ref 37–54)
EOSINOPHIL # BLD AUTO: 0.26 10*3/MM3 (ref 0–0.4)
EOSINOPHIL NFR BLD AUTO: 2.2 % (ref 0.3–6.2)
ERYTHROCYTE [DISTWIDTH] IN BLOOD BY AUTOMATED COUNT: 16.3 % (ref 12.3–15.4)
HCT VFR BLD AUTO: 23.9 % (ref 34–46.6)
HGB BLD-MCNC: 7.6 G/DL (ref 12–15.9)
IMM GRANULOCYTES # BLD AUTO: 0.26 10*3/MM3 (ref 0–0.05)
IMM GRANULOCYTES NFR BLD AUTO: 2.2 % (ref 0–0.5)
LYMPHOCYTES # BLD AUTO: 3.47 10*3/MM3 (ref 0.7–3.1)
LYMPHOCYTES NFR BLD AUTO: 29.2 % (ref 19.6–45.3)
MCH RBC QN AUTO: 28.1 PG (ref 26.6–33)
MCHC RBC AUTO-ENTMCNC: 31.8 G/DL (ref 31.5–35.7)
MCV RBC AUTO: 88.5 FL (ref 79–97)
MONOCYTES # BLD AUTO: 0.75 10*3/MM3 (ref 0.1–0.9)
MONOCYTES NFR BLD AUTO: 6.3 % (ref 5–12)
NEUTROPHILS NFR BLD AUTO: 59.5 % (ref 42.7–76)
NEUTROPHILS NFR BLD AUTO: 7.09 10*3/MM3 (ref 1.7–7)
NRBC BLD AUTO-RTO: 0 /100 WBC (ref 0–0.2)
PLATELET # BLD AUTO: 287 10*3/MM3 (ref 140–450)
PMV BLD AUTO: 11 FL (ref 6–12)
RBC # BLD AUTO: 2.7 10*6/MM3 (ref 3.77–5.28)
WBC NRBC COR # BLD: 11.9 10*3/MM3 (ref 3.4–10.8)

## 2023-03-29 PROCEDURE — 85025 COMPLETE CBC W/AUTO DIFF WBC: CPT | Performed by: PHYSICIAN ASSISTANT

## 2023-03-29 RX ORDER — ACETAMINOPHEN 500 MG
1000 TABLET ORAL EVERY 8 HOURS
Qty: 60 TABLET | Refills: 0 | Status: SHIPPED | OUTPATIENT
Start: 2023-03-29

## 2023-03-29 RX ORDER — IBUPROFEN 800 MG/1
800 TABLET ORAL EVERY 8 HOURS
Qty: 30 TABLET | Refills: 0 | Status: SHIPPED | OUTPATIENT
Start: 2023-03-29

## 2023-03-29 RX ORDER — OXYCODONE HYDROCHLORIDE 5 MG/1
5 TABLET ORAL EVERY 6 HOURS PRN
Qty: 10 TABLET | Refills: 0 | Status: SHIPPED | OUTPATIENT
Start: 2023-03-29

## 2023-03-29 RX ORDER — FERROUS SULFATE TAB EC 324 MG (65 MG FE EQUIVALENT) 324 (65 FE) MG
324 TABLET DELAYED RESPONSE ORAL 2 TIMES DAILY WITH MEALS
Qty: 60 TABLET | Refills: 2 | Status: SHIPPED | OUTPATIENT
Start: 2023-03-29

## 2023-03-29 RX ORDER — OXYCODONE HYDROCHLORIDE 5 MG/1
5 CAPSULE ORAL EVERY 4 HOURS PRN
Qty: 10 CAPSULE | Refills: 0 | Status: SHIPPED | OUTPATIENT
Start: 2023-03-29 | End: 2023-03-29 | Stop reason: SDUPTHER

## 2023-03-29 RX ADMIN — FERROUS SULFATE TAB EC 324 MG (65 MG FE EQUIVALENT) 324 MG: 324 (65 FE) TABLET DELAYED RESPONSE at 08:28

## 2023-03-29 RX ADMIN — SIMETHICONE 80 MG: 80 TABLET, CHEWABLE ORAL at 08:28

## 2023-03-29 RX ADMIN — PRENATAL VITAMINS-IRON FUMARATE 27 MG IRON-FOLIC ACID 0.8 MG TABLET 1 TABLET: at 08:28

## 2023-03-29 RX ADMIN — ACETAMINOPHEN 1000 MG: 500 TABLET, FILM COATED ORAL at 00:58

## 2023-03-29 RX ADMIN — IBUPROFEN 800 MG: 800 TABLET, FILM COATED ORAL at 05:47

## 2023-03-29 NOTE — DISCHARGE SUMMARY
Discharge Summary      Date of Admission: 3/26/2023   Date of Discharge: 3/29/2023   Admitting Diagnosis: Headache in pregnancy [O26.899, R51.9]  Pregnancy [Z34.90]   Discharge Diagnosis:  Primary  section at 34 weeks 6 days, dichorionic diamniotic twin gestation, premature rupture membranes, anemia, gestational hypertension   Procedures Performed: Procedure(s):   SECTION PRIMARY      Consults: Consults     No orders found from 2023 to 3/27/2023.         Brief History: Patient is a 33 y.o. female underwent primary  section at 34 weeks and 6 days with twins after premature rupture membranes.  Patient also had had gestational hypertension and presented with a severe headache.   Hospital Course:  Patient had unremarkable postoperative course.  On day 3 postop the patient felt ready for discharge.  Her pain was well controlled.  She was ambulating, passing gas, voiding without difficulty.  Her babies were doing well and both discharged.        Pending Studies:      Condition at discharge: Good   Discharge Medications:    Discharge Medications      New Medications      Instructions Start Date   Acetaminophen Extra Strength 500 MG tablet  Generic drug: acetaminophen   1,000 mg, Oral, Every 8 Hours      ferrous sulfate 324 (65 Fe) MG tablet delayed-release EC tablet  Replaces: FeroSul 325 (65 FE) MG tablet   324 mg, Oral, 2 Times Daily With Meals      ibuprofen 800 MG tablet  Commonly known as: ADVIL,MOTRIN   800 mg, Oral, Every 8 Hours      oxyCODONE 5 MG immediate release tablet  Commonly known as: Roxicodone   5 mg, Oral, Every 6 Hours PRN         Continue These Medications      Instructions Start Date   PRENATAL VITAMINS PO   1 capsule, Oral, Daily         Stop These Medications    famotidine 20 MG tablet  Commonly known as: PEPCID     FeroSul 325 (65 FE) MG tablet  Generic drug: ferrous sulfate  Replaced by: ferrous sulfate 324 (65 Fe) MG tablet delayed-release EC tablet     pantoprazole  20 MG EC tablet  Commonly known as: Protonix           Discharge Disposition: Home or Self Care   Follow-up: Future Appointments   Date Time Provider Department Center   4/3/2023  1:10 PM Brody Farah MD [facility]          Time: Discharge 15 min    Follow up:   Future Appointments   Date Time Provider Department Center   4/3/2023  1:10 PM Brody Farah MD [facility]       This note has been electronically signed.    Thais Lindsay PA-C   March 29, 2023

## 2023-03-29 NOTE — PLAN OF CARE
Goal Outcome Evaluation:  Plan of Care Reviewed With: patient        Progress: improving  Outcome Evaluation: VSS, pain well controlled with PO meds, fundus and lochia WNL, ambulating without difficulty. bonding well with twins. Discharge teaching given and patient verbally understood. all questions and concerns answered. Follow up appt made for one week.

## 2023-03-29 NOTE — PLAN OF CARE
Goal Outcome Evaluation:           Progress: no change  Outcome Evaluation: VSS, pain well controlled with PO medications. Fundus is firm without massage 1-2cm below the umbillicus, bleeding is scant to light. Appropriate I and O. MAbulates without difficulty. Positive maternal infant bond noted with twins

## 2023-04-03 ENCOUNTER — OFFICE VISIT (OUTPATIENT)
Dept: OBSTETRICS AND GYNECOLOGY | Facility: CLINIC | Age: 33
End: 2023-04-03
Payer: COMMERCIAL

## 2023-04-03 VITALS
SYSTOLIC BLOOD PRESSURE: 122 MMHG | BODY MASS INDEX: 32.25 KG/M2 | HEIGHT: 63 IN | DIASTOLIC BLOOD PRESSURE: 84 MMHG | WEIGHT: 182 LBS

## 2023-04-03 DIAGNOSIS — D64.9 ANEMIA, UNSPECIFIED TYPE: ICD-10-CM

## 2023-04-03 DIAGNOSIS — Z98.891 S/P CESAREAN SECTION: Primary | ICD-10-CM

## 2023-04-03 PROCEDURE — 99213 OFFICE O/P EST LOW 20 MIN: CPT | Performed by: OBSTETRICS & GYNECOLOGY

## 2023-04-03 NOTE — PROGRESS NOTES
"Postoperative Assessment Visit    Subjective   Chief Complaint   Patient presents with   • Post-op     8 days post op , feels weak.       33 y.o.  female who presents for a post-op visit.    Pre-Operative Dx:   IUP at 34w6d weeks   Dichorionic, diamniotic twin gestation (Cephalic/Breech)   prelabor rupture of membranes (PPROM) of Baby A  Severe headache  Gestational hypertension  BMI 37  Previous LSO       Postoperative dx:    Same      Procedure: Primary  (LTCS)     The patient is doing well with no issues. The babies both went home with her from the hospital and are doing well.     Objective   Vitals:    23 1329   BP: 122/84   Weight: 82.6 kg (182 lb)   Height: 160 cm (63\")     Physical Exam:  Incision(s): Well-healing, no signs of infection or separation  Reassuring postoperative exam       Medical Decision Making:    I have reviewed the operative note.  I have reviewed the postoperative labs where appropriate.    Assessment   pLTCS  Post-op evaluation  gHTN  Anemia     Plan    Orders Placed This Encounter   Procedures   • CBC & Differential     Order Specific Question:   Manual Differential     Answer:   No     Order Specific Question:   Release to patient     Answer:   Routine Release       Medication Management: none    Patient is meeting all post-op milestones.  Pathology reviewed   Surgical findings discussed.  Postoperative precautions and restrictions reviewed.  Repeat Hgb today  IUD handouts    RTC for full PP visit in 5 weeks.    Brody Farah MD  Obstetrics and Gynecology  [facility]  "

## 2023-04-04 LAB
BASOPHILS # BLD AUTO: 0.07 10*3/MM3 (ref 0–0.2)
BASOPHILS NFR BLD AUTO: 0.7 % (ref 0–1.5)
EOSINOPHIL # BLD AUTO: 0.19 10*3/MM3 (ref 0–0.4)
EOSINOPHIL NFR BLD AUTO: 1.8 % (ref 0.3–6.2)
ERYTHROCYTE [DISTWIDTH] IN BLOOD BY AUTOMATED COUNT: 14.7 % (ref 12.3–15.4)
HCT VFR BLD AUTO: 29.9 % (ref 34–46.6)
HGB BLD-MCNC: 9.5 G/DL (ref 12–15.9)
IMM GRANULOCYTES # BLD AUTO: 0.12 10*3/MM3 (ref 0–0.05)
IMM GRANULOCYTES NFR BLD AUTO: 1.1 % (ref 0–0.5)
LYMPHOCYTES # BLD AUTO: 2.57 10*3/MM3 (ref 0.7–3.1)
LYMPHOCYTES NFR BLD AUTO: 24.2 % (ref 19.6–45.3)
MCH RBC QN AUTO: 27.5 PG (ref 26.6–33)
MCHC RBC AUTO-ENTMCNC: 31.8 G/DL (ref 31.5–35.7)
MCV RBC AUTO: 86.7 FL (ref 79–97)
MONOCYTES # BLD AUTO: 0.5 10*3/MM3 (ref 0.1–0.9)
MONOCYTES NFR BLD AUTO: 4.7 % (ref 5–12)
NEUTROPHILS # BLD AUTO: 7.17 10*3/MM3 (ref 1.7–7)
NEUTROPHILS NFR BLD AUTO: 67.5 % (ref 42.7–76)
NRBC BLD AUTO-RTO: 0.1 /100 WBC (ref 0–0.2)
PLATELET # BLD AUTO: 609 10*3/MM3 (ref 140–450)
RBC # BLD AUTO: 3.45 10*6/MM3 (ref 3.77–5.28)
WBC # BLD AUTO: 10.62 10*3/MM3 (ref 3.4–10.8)

## 2023-05-10 ENCOUNTER — POSTPARTUM VISIT (OUTPATIENT)
Dept: OBSTETRICS AND GYNECOLOGY | Facility: CLINIC | Age: 33
End: 2023-05-10
Payer: COMMERCIAL

## 2023-05-10 VITALS
WEIGHT: 176 LBS | SYSTOLIC BLOOD PRESSURE: 114 MMHG | DIASTOLIC BLOOD PRESSURE: 78 MMHG | HEIGHT: 63 IN | BODY MASS INDEX: 31.18 KG/M2

## 2023-05-10 DIAGNOSIS — Z30.016 ENCOUNTER FOR INITIAL PRESCRIPTION OF TRANSDERMAL PATCH HORMONAL CONTRACEPTIVE DEVICE: ICD-10-CM

## 2023-05-10 DIAGNOSIS — Z98.891 S/P CESAREAN SECTION: ICD-10-CM

## 2023-05-10 RX ORDER — LORATADINE/PSEUDOEPHEDRINE 10MG-240MG
TABLET, EXTENDED RELEASE 24 HR ORAL
COMMUNITY
Start: 2023-05-09

## 2023-05-10 NOTE — PROGRESS NOTES
"Postpartum Visit    Subjective   Chief Complaint   Patient presents with   • Postpartum Care     6 weeks postpartum,  on 23, last pap 22 WNL, bottle feeding, wants to discuss birth control options.       33 y.o.  female who presents for postpartum care.    Pre-Operative Dx:   IUP at 34w6d weeks   Dichorionic, diamniotic twin gestation (Cephalic/Breech)   prelabor rupture of membranes (PPROM) of Baby A  Severe headache  Gestational hypertension  BMI 37  Previous LSO       Postoperative dx:    Same      Procedure: Primary  (LTCS)     She reports ambulating/eating/drinking/voiding/stooling without difficulty. Her pain is well controlled. She is bottle feeding without concern. Her mood is \"normal\". She has good support at home. Contraception: not sure       Objective   Vitals:    05/10/23 1439   BP: 114/78   Weight: 79.8 kg (176 lb)   Height: 160 cm (63\")     Physical Exam:  Normal postpartum exam  Incision: Well-healing, no signs of infection or separation  Normal pelvic and bimanual exams       Plan   Medical Decision Making:    I have reviewed the prenatal and postpartum labs. I have reviewed the delivery note and discharge summary.    Assessment/Plan:  pLTCS  Postpartum care and examination  General counseling regarding contraception    Pt is meeting all postpartum milestones  Bottle feeding without issue  Mood appropriate  Contraception: Patches called in    RTC for annual visits.    Brody Farah MD  Obstetrics and Gynecology  [facility]  "

## 2023-08-07 ENCOUNTER — TELEPHONE (OUTPATIENT)
Dept: OBSTETRICS AND GYNECOLOGY | Facility: CLINIC | Age: 33
End: 2023-08-07
Payer: COMMERCIAL

## 2023-08-07 NOTE — TELEPHONE ENCOUNTER
----- Message from Kely Fink sent at 8/7/2023  3:20 PM EDT -----  Regarding: Dr. Farah  Contact: [phone]  Please complete the attached form for my return to work. My start date is 8-14-23. I have to have the form sent to my hr department as soon as possible. If you don't mind please email me the form once it's complete so I can sign it and then I will fax it to hr. My email is [email or web address] thank you so much. God bless!

## 2023-08-08 NOTE — TELEPHONE ENCOUNTER
Emailed work release form to patient. Spoke to patient and informed her if she didn't receive to contact the office.

## 2023-11-08 ENCOUNTER — TELEPHONE (OUTPATIENT)
Dept: OBSTETRICS AND GYNECOLOGY | Facility: CLINIC | Age: 33
End: 2023-11-08

## 2023-11-08 NOTE — TELEPHONE ENCOUNTER
Hub staff attempted to follow warm transfer process and was unsuccessful     Caller: Kely Fink    Relationship to patient: Self    Best call back number: [phone] / LVM    Patient is needing: PT HAD U/S & GYN APPT TODAY BUT WAS CANCELED DUE TO PROVIDER BEING SICK - WAS R/S FOR 12/01/23 AND PT IS WANTING TO KNOW IF SHE CAN BE SEEN SOONER BY SOMEONE ELSE BECAUSE SHE HASN'T HAD A CYCLE FOR ABOUT 2 MTHS - PT TOOK 2 PREG TESTS - FIRST ONE WAS FAINT AND THE SECOND ONE WAS NEG - PT IS JUST SCARED AND HAS HAD MISCARRIAGES IN THE PAST    PLEASE CALL THE PT TO LET HER KNOW IF SHE CAN BE SEEN SOONER THAN 12/01/23     THANK YOU!

## 2023-11-08 NOTE — TELEPHONE ENCOUNTER
Called patient to let her have an appointment for 11/21 and she said she scheduled and appointment in [place] for tomorrow.

## 2023-11-21 ENCOUNTER — OFFICE VISIT (OUTPATIENT)
Dept: OBSTETRICS AND GYNECOLOGY | Facility: CLINIC | Age: 33
End: 2023-11-21
Payer: COMMERCIAL

## 2023-11-21 VITALS
WEIGHT: 162.6 LBS | DIASTOLIC BLOOD PRESSURE: 62 MMHG | SYSTOLIC BLOOD PRESSURE: 104 MMHG | HEIGHT: 63 IN | BODY MASS INDEX: 28.81 KG/M2

## 2023-11-21 DIAGNOSIS — N93.9 ABNORMAL UTERINE BLEEDING (AUB): Primary | ICD-10-CM

## 2023-11-21 RX ORDER — MEDROXYPROGESTERONE ACETATE 10 MG/1
10 TABLET ORAL DAILY
Qty: 10 TABLET | Refills: 0 | Status: SHIPPED | OUTPATIENT
Start: 2023-11-21

## 2023-11-22 ENCOUNTER — LAB (OUTPATIENT)
Dept: LAB | Facility: HOSPITAL | Age: 33
End: 2023-11-22
Payer: COMMERCIAL

## 2023-11-22 DIAGNOSIS — R79.89 ELEVATED SERUM HCG: Primary | ICD-10-CM

## 2023-11-22 LAB
ALBUMIN SERPL-MCNC: 4.8 G/DL (ref 3.5–5.2)
ALBUMIN/GLOB SERPL: 1.5 G/DL
ALP SERPL-CCNC: 86 U/L (ref 39–117)
ALT SERPL W P-5'-P-CCNC: 11 U/L (ref 1–33)
ANION GAP SERPL CALCULATED.3IONS-SCNC: 12 MMOL/L (ref 5–15)
AST SERPL-CCNC: 17 U/L (ref 1–32)
BASOPHILS # BLD AUTO: 0.06 10*3/MM3 (ref 0–0.2)
BASOPHILS NFR BLD AUTO: 0.6 % (ref 0–1.5)
BILIRUB SERPL-MCNC: 0.3 MG/DL (ref 0–1.2)
BUN SERPL-MCNC: 12 MG/DL (ref 6–20)
BUN/CREAT SERPL: 16.9 (ref 7–25)
CALCIUM SPEC-SCNC: 9.9 MG/DL (ref 8.6–10.5)
CHLORIDE SERPL-SCNC: 102 MMOL/L (ref 98–107)
CO2 SERPL-SCNC: 27 MMOL/L (ref 22–29)
CREAT SERPL-MCNC: 0.71 MG/DL (ref 0.57–1)
DEPRECATED RDW RBC AUTO: 43.9 FL (ref 37–54)
EGFRCR SERPLBLD CKD-EPI 2021: 116.7 ML/MIN/1.73
EOSINOPHIL # BLD AUTO: 0.15 10*3/MM3 (ref 0–0.4)
EOSINOPHIL NFR BLD AUTO: 1.6 % (ref 0.3–6.2)
ERYTHROCYTE [DISTWIDTH] IN BLOOD BY AUTOMATED COUNT: 13.4 % (ref 12.3–15.4)
GLOBULIN UR ELPH-MCNC: 3.1 GM/DL
GLUCOSE SERPL-MCNC: 103 MG/DL (ref 65–99)
HCG INTACT+B SERPL-ACNC: <0.1 MIU/ML
HCG INTACT+B SERPL-ACNC: NORMAL MIU/ML
HCT VFR BLD AUTO: 40.7 % (ref 34–46.6)
HGB BLD-MCNC: 13 G/DL (ref 12–15.9)
IMM GRANULOCYTES # BLD AUTO: 0.03 10*3/MM3 (ref 0–0.05)
IMM GRANULOCYTES NFR BLD AUTO: 0.3 % (ref 0–0.5)
LYMPHOCYTES # BLD AUTO: 2.94 10*3/MM3 (ref 0.7–3.1)
LYMPHOCYTES NFR BLD AUTO: 31.6 % (ref 19.6–45.3)
MCH RBC QN AUTO: 28.4 PG (ref 26.6–33)
MCHC RBC AUTO-ENTMCNC: 31.9 G/DL (ref 31.5–35.7)
MCV RBC AUTO: 88.9 FL (ref 79–97)
MONOCYTES # BLD AUTO: 0.45 10*3/MM3 (ref 0.1–0.9)
MONOCYTES NFR BLD AUTO: 4.8 % (ref 5–12)
NEUTROPHILS NFR BLD AUTO: 5.67 10*3/MM3 (ref 1.7–7)
NEUTROPHILS NFR BLD AUTO: 61.1 % (ref 42.7–76)
NRBC BLD AUTO-RTO: 0 /100 WBC (ref 0–0.2)
PLATELET # BLD AUTO: 331 10*3/MM3 (ref 140–450)
PMV BLD AUTO: 11.9 FL (ref 6–12)
POTASSIUM SERPL-SCNC: 3.5 MMOL/L (ref 3.5–5.2)
PROLACTIN SERPL-MCNC: 21.7 NG/ML (ref 4.8–23.3)
PROT SERPL-MCNC: 7.9 G/DL (ref 6–8.5)
RBC # BLD AUTO: 4.58 10*6/MM3 (ref 3.77–5.28)
SODIUM SERPL-SCNC: 141 MMOL/L (ref 136–145)
TSH SERPL DL<=0.005 MIU/L-ACNC: 0.33 UIU/ML (ref 0.27–4.2)
WBC NRBC COR # BLD AUTO: 9.3 10*3/MM3 (ref 3.4–10.8)

## 2023-11-22 PROCEDURE — 85025 COMPLETE CBC W/AUTO DIFF WBC: CPT | Performed by: OBSTETRICS & GYNECOLOGY

## 2023-11-22 PROCEDURE — 80053 COMPREHEN METABOLIC PANEL: CPT | Performed by: OBSTETRICS & GYNECOLOGY

## 2023-11-22 PROCEDURE — 36415 COLL VENOUS BLD VENIPUNCTURE: CPT | Performed by: OBSTETRICS & GYNECOLOGY

## 2023-11-22 PROCEDURE — 84146 ASSAY OF PROLACTIN: CPT | Performed by: OBSTETRICS & GYNECOLOGY

## 2023-11-22 PROCEDURE — 84702 CHORIONIC GONADOTROPIN TEST: CPT | Performed by: OBSTETRICS & GYNECOLOGY

## 2023-11-22 NOTE — PROGRESS NOTES
Patient needs to come in today for an hCG quantitative, CMP, CBC downstairs in the hospital lab  Her hCG level significantly elevated on blood work done yesterday this may be lab error and that is why I want repeated with the downstairs lab.

## 2023-11-24 LAB — PROLACTIN SERPL-MCNC: 11.8 NG/ML (ref 4.8–23.3)

## 2023-11-27 NOTE — PROGRESS NOTES
Lab work normal/negative.  This must be laboratory error.  Please contact [facility] for investigation into her preceding hCG

## 2023-12-01 DIAGNOSIS — R79.89 ELEVATED SERUM HCG: Primary | ICD-10-CM

## 2023-12-04 ENCOUNTER — LAB (OUTPATIENT)
Dept: LAB | Facility: HOSPITAL | Age: 33
End: 2023-12-04
Payer: COMMERCIAL

## 2023-12-04 PROCEDURE — 84702 CHORIONIC GONADOTROPIN TEST: CPT | Performed by: OBSTETRICS & GYNECOLOGY

## 2024-07-17 ENCOUNTER — OFFICE VISIT (OUTPATIENT)
Dept: OBSTETRICS AND GYNECOLOGY | Facility: CLINIC | Age: 34
End: 2024-07-17
Payer: COMMERCIAL

## 2024-07-17 VITALS
WEIGHT: 176 LBS | SYSTOLIC BLOOD PRESSURE: 102 MMHG | HEIGHT: 63 IN | BODY MASS INDEX: 31.18 KG/M2 | DIASTOLIC BLOOD PRESSURE: 60 MMHG

## 2024-07-17 DIAGNOSIS — N83.201 CYST OF RIGHT OVARY: ICD-10-CM

## 2024-07-17 DIAGNOSIS — Z31.9 DESIRE FOR PREGNANCY: ICD-10-CM

## 2024-07-17 DIAGNOSIS — Z13.29 SCREENING FOR THYROID DISORDER: ICD-10-CM

## 2024-07-17 DIAGNOSIS — N92.6 IRREGULAR MENSES: ICD-10-CM

## 2024-07-17 DIAGNOSIS — N92.6 MISSED PERIOD: Primary | ICD-10-CM

## 2024-07-17 DIAGNOSIS — Z13.1 SCREENING FOR DIABETES MELLITUS: ICD-10-CM

## 2024-07-17 PROCEDURE — 99214 OFFICE O/P EST MOD 30 MIN: CPT | Performed by: OBSTETRICS & GYNECOLOGY

## 2024-07-17 RX ORDER — MEDROXYPROGESTERONE ACETATE 10 MG/1
10 TABLET ORAL DAILY
Qty: 10 TABLET | Refills: 5 | Status: SHIPPED | OUTPATIENT
Start: 2024-07-17

## 2024-07-18 LAB
ESTRADIOL SERPL-MCNC: 175 PG/ML
FSH SERPL-ACNC: 8.9 MIU/ML
HBA1C MFR BLD: 5.2 % (ref 4.8–5.6)
HCG INTACT+B SERPL-ACNC: <1 MIU/ML
LH SERPL-ACNC: 30.7 MIU/ML
PROGEST SERPL-MCNC: 0.6 NG/ML
PROLACTIN SERPL-MCNC: 25.9 NG/ML (ref 4.8–33.4)
TSH SERPL DL<=0.005 MIU/L-ACNC: 2.5 UIU/ML (ref 0.27–4.2)

## 2024-07-31 NOTE — PROGRESS NOTES
GYN Office Visit    Subjective   Chief Complaint   Patient presents with    Ovarian Cyst     TVS done today     Kely Fink is a 34 y.o. year old  presenting to be seen for an ovarian cyst and desire for pregnancy.    She was noted to have a small follicle/cyst on the right ovary in November.  She recently missed her period. Patient's last menstrual period was 2024.  She would like to be pregnant in the near future.  Periods have been irregular since her most recent delivery in March of last year.    OB Hx:  x 1, SAB x 1  Pap smear:  NILM    Past Medical History:   Diagnosis Date    Dichorionic diamniotic twin pregnancy in first trimester 2022    pt reports only having one ovary and one tube (left)    Endometriosis     Female infertility 2018    endometriosis and only one ovary and tube    GERD (gastroesophageal reflux disease)     Gestational hypertension     x 1 month / on modified bedrest/ no official diagnosis    History of bronchitis     History of endometriosis 2018    dx during surgery to remove tube and ovary (right)    Missed ab 2019    x1    Ovarian cyst     grew into a tumor and needed to be removed    PONV (postoperative nausea and vomiting)     Reflux esophagitis     Wears glasses        Past Surgical History:   Procedure Laterality Date     SECTION N/A 3/26/2023    Procedure:  SECTION PRIMARY;  Surgeon: Brody Farah MD;  Location: [facility] OR;  Service: Obstetrics/Gynecology;  Laterality: N/A;    D & C WITH SUCTION N/A 3/5/2019    Procedure: DILATATION AND CURETTAGE WITH SUCTION;  Surgeon: Jordan Mina MD;  Location: [facility] OR;  Service: Obstetrics/Gynecology    DIAGNOSTIC LAPAROSCOPY N/A 10/23/2020    Procedure: DIAGNOSTIC LAPAROSCOPY, ablation of endometriosis, chromopertubation;  Surgeon: Brendon Daniels MD;  Location: [facility] OR;  Service: Obstetrics/Gynecology;  Laterality: N/A;    ENDOSCOPY      OOPHORECTOMY Left      "OVARIAN CYST DRAINAGE/EXCISION Left 7/24/2018    Procedure: LAPAROSCOP WITH LEFT OOPHORECTOMY with tube and cyst; extensive lysis of adhesions;  Surgeon: Jordan Mina MD;  Location: [facility];  Service: Obstetrics/Gynecology    WISDOM TOOTH EXTRACTION         Family History   Problem Relation Age of Onset    Hypertension Mother     Diabetes Father         Social History     Tobacco Use    Smoking status: Never    Smokeless tobacco: Never   Vaping Use    Vaping status: Never Used   Substance Use Topics    Alcohol use: Never    Drug use: Never       (Not in a hospital admission)      Patient has no known allergies.    Current Outpatient Medications on File Prior to Visit   Medication Sig Dispense Refill    acetaminophen (TYLENOL) 500 MG tablet Take 2 tablets by mouth Every 8 (Eight) Hours. 60 tablet 0     No current facility-administered medications on file prior to visit.       Social History    Tobacco Use      Smoking status: Never      Smokeless tobacco: Never         Objective   /60   Ht 160 cm (63\")   Wt 79.8 kg (176 lb)   LMP 05/20/2024   BMI 31.18 kg/m²     Physical Exam:  General Appearance: alert, pleasant, appears stated age, interactive and cooperative         Medical Decision Making:    I reviewed her ultrasound from November and today.    U/S:  Normal-sized, anteverted uterus with no masses. The endometrium measures 7 mm. Previous LSO. The right ovary contains a 2 cm simple appearing follicle/cyst and normal vascularity. No free fluid in the cul-de-sac.     Assessment   Irregular menses  Desire for pregnancy  Right ovarian cyst, resolved     Plan    Orders Placed This Encounter   Procedures    HCG, B-subunit, Quantitative ([facility] Only)     Order Specific Question:   Release to patient     Answer:   Routine Release [[id number]]    Prolactin     Order Specific Question:   Release to patient     Answer:   Routine Release [[id number]]    TSH     Order Specific Question:   Release to patient "     Answer:   Routine Release [[id number]]    Follicle Stimulating Hormone     Order Specific Question:   Release to patient     Answer:   Routine Release [[id number]]    Estradiol     Order Specific Question:   Release to patient     Answer:   Routine Release [[id number]]    Luteinizing Hormone     Order Specific Question:   Release to patient     Answer:   Routine Release [[id number]]    Progesterone     Order Specific Question:   Release to patient     Answer:   Routine Release [[id number]]    Hemoglobin A1c     Order Specific Question:   Release to patient     Answer:   Routine Release [[id number]]       Medication Management: Provera 10 mg daily x 10 days    Procedures Performed: None    We reviewed her situation in detail today.  Her ultrasound is reassuring.  Blood work was ordered as above.  Provera was ordered for withdrawal bleed. We discussed the potential of adding Clomid.  Menstrual cycle + timed intercourse discussed.  Will follow-up by phone.  All questions answered.    Brody Farah MD  Obstetrics and Gynecology  [facility]

## 2025-08-07 ENCOUNTER — OFFICE VISIT (OUTPATIENT)
Dept: OBSTETRICS AND GYNECOLOGY | Facility: CLINIC | Age: 35
End: 2025-08-07
Payer: COMMERCIAL

## 2025-08-07 VITALS
SYSTOLIC BLOOD PRESSURE: 126 MMHG | HEIGHT: 63 IN | DIASTOLIC BLOOD PRESSURE: 80 MMHG | WEIGHT: 178.4 LBS | BODY MASS INDEX: 31.61 KG/M2

## 2025-08-07 DIAGNOSIS — N92.6 MENSTRUAL PERIOD LATE: ICD-10-CM

## 2025-08-07 DIAGNOSIS — Z31.9 DESIRE FOR PREGNANCY: ICD-10-CM

## 2025-08-07 DIAGNOSIS — Z01.419 WELL WOMAN EXAM WITH ROUTINE GYNECOLOGICAL EXAM: Primary | ICD-10-CM

## 2025-08-07 LAB
B-HCG UR QL: NEGATIVE
EXPIRATION DATE: NORMAL
INTERNAL NEGATIVE CONTROL: NORMAL
INTERNAL POSITIVE CONTROL: NORMAL
Lab: NORMAL

## (undated) DEVICE — RICH C-SECTION: Brand: MEDLINE INDUSTRIES, INC.

## (undated) DEVICE — COR GYN LAPAROSCOPY: Brand: MEDLINE INDUSTRIES, INC.

## (undated) DEVICE — SOL IRR NACL 0.9PCT BT 1000ML

## (undated) DEVICE — ENDOPATH XCEL UNIVERSAL TROCAR STABLILITY SLEEVES: Brand: ENDOPATH XCEL

## (undated) DEVICE — SUT PDS 0 CT 36IN DYED Z358T

## (undated) DEVICE — PK DAVINCI 70

## (undated) DEVICE — LARGE, DISPOSABLE ALEXIS O C-SECTION PROTECTOR - RETRACTOR: Brand: ALEXIS ® O C-SECTION PROTECTOR - RETRACTOR

## (undated) DEVICE — SYS FLUID MGMT HYST SAFETOUCH

## (undated) DEVICE — APPL CHLORAPREP W/TINT 26ML ORNG

## (undated) DEVICE — SUT MNCRYL 0 CT 36IN

## (undated) DEVICE — COR MINOR LITHOTOMY: Brand: MEDLINE INDUSTRIES, INC.

## (undated) DEVICE — DRSNG WND BORDR/ADHS NONADHR/GZ LF 4X10IN STRL

## (undated) DEVICE — SUT VIC 0/0 UR6 27IN DYED J603H

## (undated) DEVICE — SUT ETHLN 4/0 PS2 PLSTC 1667G

## (undated) DEVICE — ADHS LIQ MASTISOL 2/3ML

## (undated) DEVICE — SLV SCD CALF HEMOFORCE DVT THERP REPROC MD

## (undated) DEVICE — GLV SURG SENSICARE PI LF PF 7.5 GRN STRL

## (undated) DEVICE — ENDOPOUCH RETRIEVER SPECIMEN RETRIEVAL BAGS: Brand: ENDOPOUCH RETRIEVER

## (undated) DEVICE — TOTAL TRAY, 16FR 10ML SIL FOLEY, URN: Brand: MEDLINE

## (undated) DEVICE — ENDOPATH XCEL BLADELESS TROCARS WITH STABILITY SLEEVES: Brand: ENDOPATH XCEL

## (undated) DEVICE — PAD SANI MAXI W/ADHS SNG WRP 11IN

## (undated) DEVICE — SUCTION CANISTER, 1500CC, RIGID: Brand: DEROYAL

## (undated) DEVICE — SYS CLOSE PORTII CARTR/THOMASN XL

## (undated) DEVICE — 2, DISPOSABLE SUCTION/IRRIGATOR WITH DISPOSABLE TIP: Brand: STRYKEFLOW

## (undated) DEVICE — ENCORE® LATEX MICRO SIZE 7.5, STERILE LATEX POWDER-FREE SURGICAL GLOVE: Brand: ENCORE

## (undated) DEVICE — SUTURE GUT CHROMIC 3/0 912H

## (undated) DEVICE — CUFF SCD HEMOFORCE SEQ CALF STD MD

## (undated) DEVICE — TP SXN VACURETTE CRV 7MM

## (undated) DEVICE — 2, DISPOSABLE SUCTION/IRRIGATOR WITHOUT DISPOSABLE TIP: Brand: STRYKEFLOW

## (undated) DEVICE — HOSE BT TO BT VAC CURETTAGE SNGL PT USE

## (undated) DEVICE — DISPOSABLE MONOPOLAR ENDOSCOPIC CORD 10 FT. (3M): Brand: KIRWAN

## (undated) DEVICE — PAD GRND REM POLYHESIVE A/ DISP

## (undated) DEVICE — STPLR SKIN SUBCUTICULAR INSORB 2030

## (undated) DEVICE — DRP SURG U/BUTT W/PCH BACK STRL

## (undated) DEVICE — TBG W FLTR FOR BERKELEY SYSTEM

## (undated) DEVICE — ST COL BERKELY TBG

## (undated) DEVICE — FRCP DISSCT LYONS W 9PIN 5X33

## (undated) DEVICE — GLV SURG BIOGEL M LTX PF 8

## (undated) DEVICE — RICH LAVH: Brand: MEDLINE INDUSTRIES, INC.

## (undated) DEVICE — TP ELECTRD LAP L WR SPLIT33CM

## (undated) DEVICE — GLV SURG BIOGEL PI ULTRATOUCH G SZ7.5 LF

## (undated) DEVICE — HARMONIC ACE +7 LAPAROSCOPIC SHEARS ADVANCED HEMOSTASIS 5MM DIAMETER 36CM SHAFT LENGTH  FOR USE WITH GRAY HAND PIECE ONLY: Brand: HARMONIC ACE

## (undated) DEVICE — TBG PENCL TELESCP MEGADYNE SMOKE EVAC 10FT

## (undated) DEVICE — CATHETER,URETHRAL,REDRUBBER,STRL,16FR: Brand: MEDLINE

## (undated) DEVICE — SPNG GZ WOVN 4X4IN 12PLY 10/BX STRL

## (undated) DEVICE — PATIENT RETURN ELECTRODE, SINGLE-USE, CONTACT QUALITY MONITORING, ADULT, WITH 9FT CORD, FOR PATIENTS WEIGING OVER 33LBS. (15KG): Brand: MEGADYNE

## (undated) DEVICE — STRIP,CLOSURE,WOUND,MEDI-STRIP,1/2X4: Brand: MEDLINE

## (undated) DEVICE — SOL NACL 0.9PCT 1000ML

## (undated) DEVICE — [HIGH FLOW INSUFFLATOR,  DO NOT USE IF PACKAGE IS DAMAGED,  KEEP DRY,  KEEP AWAY FROM SUNLIGHT,  PROTECT FROM HEAT AND RADIOACTIVE SOURCES.]: Brand: PNEUMOSURE

## (undated) DEVICE — BLD CLIP UNIV SURG GRY